# Patient Record
Sex: FEMALE | Race: WHITE | NOT HISPANIC OR LATINO | Employment: OTHER | URBAN - METROPOLITAN AREA
[De-identification: names, ages, dates, MRNs, and addresses within clinical notes are randomized per-mention and may not be internally consistent; named-entity substitution may affect disease eponyms.]

---

## 2023-03-23 ENCOUNTER — TELEPHONE (OUTPATIENT)
Dept: PRIMARY CARE | Facility: CLINIC | Age: 68
End: 2023-03-23
Payer: MEDICARE

## 2023-03-23 DIAGNOSIS — E03.9 ACQUIRED HYPOTHYROIDISM: Primary | ICD-10-CM

## 2023-03-23 RX ORDER — LEVOTHYROXINE SODIUM 25 UG/1
25 TABLET ORAL DAILY
Qty: 90 TABLET | Refills: 3 | Status: SHIPPED | OUTPATIENT
Start: 2023-03-23 | End: 2023-05-18 | Stop reason: SDUPTHER

## 2023-03-23 RX ORDER — LEVOTHYROXINE SODIUM 25 UG/1
25 TABLET ORAL DAILY
COMMUNITY
End: 2023-03-23 | Stop reason: SDUPTHER

## 2023-03-23 NOTE — TELEPHONE ENCOUNTER
**left on voicemail     Refill request for Levothyroxine 25mcg 1 tablet daily for 90 days     [facility]

## 2023-03-29 LAB — URINE CULTURE: ABNORMAL

## 2023-05-05 LAB
ALANINE AMINOTRANSFERASE (SGPT) (U/L) IN SER/PLAS: 23 U/L (ref 7–45)
ALBUMIN (G/DL) IN SER/PLAS: 4.2 G/DL (ref 3.4–5)
ALKALINE PHOSPHATASE (U/L) IN SER/PLAS: 74 U/L (ref 33–136)
ANION GAP IN SER/PLAS: 11 MMOL/L (ref 10–20)
ASPARTATE AMINOTRANSFERASE (SGOT) (U/L) IN SER/PLAS: 23 U/L (ref 9–39)
BASOPHILS (10*3/UL) IN BLOOD BY AUTOMATED COUNT: 0.03 X10E9/L (ref 0–0.1)
BASOPHILS/100 LEUKOCYTES IN BLOOD BY AUTOMATED COUNT: 0.5 % (ref 0–2)
BILIRUBIN TOTAL (MG/DL) IN SER/PLAS: 0.4 MG/DL (ref 0–1.2)
CALCIDIOL (25 OH VITAMIN D3) (NG/ML) IN SER/PLAS: 69 NG/ML
CALCIUM (MG/DL) IN SER/PLAS: 9.2 MG/DL (ref 8.6–10.3)
CARBON DIOXIDE, TOTAL (MMOL/L) IN SER/PLAS: 27 MMOL/L (ref 21–32)
CHLORIDE (MMOL/L) IN SER/PLAS: 105 MMOL/L (ref 98–107)
CHOLESTEROL (MG/DL) IN SER/PLAS: 258 MG/DL (ref 0–199)
CHOLESTEROL IN HDL (MG/DL) IN SER/PLAS: 100.6 MG/DL
CHOLESTEROL/HDL RATIO: 2.6
COBALAMIN (VITAMIN B12) (PG/ML) IN SER/PLAS: 1103 PG/ML (ref 211–911)
CREATININE (MG/DL) IN SER/PLAS: 0.73 MG/DL (ref 0.5–1.05)
EOSINOPHILS (10*3/UL) IN BLOOD BY AUTOMATED COUNT: 0.12 X10E9/L (ref 0–0.7)
EOSINOPHILS/100 LEUKOCYTES IN BLOOD BY AUTOMATED COUNT: 2.2 % (ref 0–6)
ERYTHROCYTE DISTRIBUTION WIDTH (RATIO) BY AUTOMATED COUNT: 13 % (ref 11.5–14.5)
ERYTHROCYTE MEAN CORPUSCULAR HEMOGLOBIN CONCENTRATION (G/DL) BY AUTOMATED: 33.2 G/DL (ref 32–36)
ERYTHROCYTE MEAN CORPUSCULAR VOLUME (FL) BY AUTOMATED COUNT: 90 FL (ref 80–100)
ERYTHROCYTES (10*6/UL) IN BLOOD BY AUTOMATED COUNT: 4.55 X10E12/L (ref 4–5.2)
ESTIMATED AVERAGE GLUCOSE FOR HBA1C: 134 MG/DL
GFR FEMALE: 90 ML/MIN/1.73M2
GLUCOSE (MG/DL) IN SER/PLAS: 103 MG/DL (ref 74–99)
HEMATOCRIT (%) IN BLOOD BY AUTOMATED COUNT: 41 % (ref 36–46)
HEMOGLOBIN (G/DL) IN BLOOD: 13.6 G/DL (ref 12–16)
HEMOGLOBIN A1C/HEMOGLOBIN TOTAL IN BLOOD: 6.3 %
IMMATURE GRANULOCYTES/100 LEUKOCYTES IN BLOOD BY AUTOMATED COUNT: 0.2 % (ref 0–0.9)
LDL: 144 MG/DL (ref 0–99)
LEUKOCYTES (10*3/UL) IN BLOOD BY AUTOMATED COUNT: 5.5 X10E9/L (ref 4.4–11.3)
LYMPHOCYTES (10*3/UL) IN BLOOD BY AUTOMATED COUNT: 2.4 X10E9/L (ref 1.2–4.8)
LYMPHOCYTES/100 LEUKOCYTES IN BLOOD BY AUTOMATED COUNT: 43.4 % (ref 13–44)
MONOCYTES (10*3/UL) IN BLOOD BY AUTOMATED COUNT: 0.51 X10E9/L (ref 0.1–1)
MONOCYTES/100 LEUKOCYTES IN BLOOD BY AUTOMATED COUNT: 9.2 % (ref 2–10)
NEUTROPHILS (10*3/UL) IN BLOOD BY AUTOMATED COUNT: 2.46 X10E9/L (ref 1.2–7.7)
NEUTROPHILS/100 LEUKOCYTES IN BLOOD BY AUTOMATED COUNT: 44.5 % (ref 40–80)
PLATELETS (10*3/UL) IN BLOOD AUTOMATED COUNT: 268 X10E9/L (ref 150–450)
POTASSIUM (MMOL/L) IN SER/PLAS: 4 MMOL/L (ref 3.5–5.3)
PROTEIN TOTAL: 7.4 G/DL (ref 6.4–8.2)
SODIUM (MMOL/L) IN SER/PLAS: 139 MMOL/L (ref 136–145)
THYROTROPIN (MIU/L) IN SER/PLAS BY DETECTION LIMIT <= 0.05 MIU/L: 0.42 MIU/L (ref 0.44–3.98)
THYROXINE (T4) FREE (NG/DL) IN SER/PLAS: 0.99 NG/DL (ref 0.61–1.12)
TRIGLYCERIDE (MG/DL) IN SER/PLAS: 66 MG/DL (ref 0–149)
UREA NITROGEN (MG/DL) IN SER/PLAS: 25 MG/DL (ref 6–23)
VLDL: 13 MG/DL (ref 0–40)

## 2023-05-16 PROBLEM — N60.21 SCLEROSING ADENOSIS OF RIGHT BREAST: Status: ACTIVE | Noted: 2023-05-16

## 2023-05-16 PROBLEM — R92.8 ABNORMAL MAMMOGRAM: Status: ACTIVE | Noted: 2023-05-16

## 2023-05-16 PROBLEM — M19.041 OSTEOARTHRITIS OF HANDS, BILATERAL: Status: ACTIVE | Noted: 2023-05-16
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
## 2023-05-16 PROBLEM — H66.91 RIGHT ACUTE OTITIS MEDIA: Status: ACTIVE | Noted: 2023-05-16

## 2023-05-16 PROBLEM — R92.0 MICROCALCIFICATION OF BREAST: Status: ACTIVE | Noted: 2023-05-16

## 2023-05-16 PROBLEM — M85.80 OSTEOPENIA: Status: ACTIVE | Noted: 2023-05-16

## 2023-05-16 PROBLEM — E03.9 HYPOTHYROIDISM IN ADULT: Status: ACTIVE | Noted: 2023-05-16

## 2023-05-16 PROBLEM — M25.519 SHOULDER PAIN: Status: ACTIVE | Noted: 2023-05-16

## 2023-05-16 PROBLEM — R73.03 PREDIABETES: Status: ACTIVE | Noted: 2023-05-16

## 2023-05-16 PROBLEM — N95.9 POSTMENOPAUSAL SYMPTOMS: Status: ACTIVE | Noted: 2023-05-16

## 2023-05-16 PROBLEM — S46.009A ROTATOR CUFF INJURY: Status: ACTIVE | Noted: 2023-05-16

## 2023-05-16 PROBLEM — E53.8 VITAMIN B12 DEFICIENCY: Status: ACTIVE | Noted: 2023-05-16

## 2023-05-16 PROBLEM — M19.042 OSTEOARTHRITIS OF HANDS, BILATERAL: Status: ACTIVE | Noted: 2023-05-16

## 2023-05-16 RX ORDER — VITAMIN E (DL,TOCOPHERYL ACET) 90 MG
1 CAPSULE ORAL DAILY
COMMUNITY
Start: 2021-01-12

## 2023-05-16 RX ORDER — EAR PLUGS
1 EACH OTIC (EAR) DAILY
COMMUNITY

## 2023-05-16 RX ORDER — CALCIUM CARBONATE 600 MG
600 TABLET ORAL DAILY
COMMUNITY
Start: 2021-01-12 | End: 2023-11-29 | Stop reason: WASHOUT

## 2023-05-16 RX ORDER — ZINC GLUCONATE 100 MG
100 TABLET ORAL DAILY
COMMUNITY
Start: 2021-01-12 | End: 2023-11-29 | Stop reason: WASHOUT

## 2023-05-16 RX ORDER — MULTIVITAMIN
1 TABLET ORAL DAILY
COMMUNITY

## 2023-05-16 RX ORDER — ACETAMINOPHEN 500 MG
50 TABLET ORAL DAILY
COMMUNITY

## 2023-05-18 ENCOUNTER — OFFICE VISIT (OUTPATIENT)
Dept: PRIMARY CARE | Facility: CLINIC | Age: 68
End: 2023-05-18
Payer: MEDICARE

## 2023-05-18 VITALS
HEIGHT: 63 IN | HEART RATE: 71 BPM | OXYGEN SATURATION: 97 % | RESPIRATION RATE: 16 BRPM | SYSTOLIC BLOOD PRESSURE: 124 MMHG | DIASTOLIC BLOOD PRESSURE: 74 MMHG | WEIGHT: 125 LBS | BODY MASS INDEX: 22.15 KG/M2

## 2023-05-18 DIAGNOSIS — E78.2 MIXED HYPERLIPIDEMIA: Primary | ICD-10-CM

## 2023-05-18 DIAGNOSIS — R73.03 PREDIABETES: ICD-10-CM

## 2023-05-18 DIAGNOSIS — E03.9 ACQUIRED HYPOTHYROIDISM: ICD-10-CM

## 2023-05-18 PROCEDURE — 1036F TOBACCO NON-USER: CPT

## 2023-05-18 PROCEDURE — 99214 OFFICE O/P EST MOD 30 MIN: CPT

## 2023-05-18 PROCEDURE — 1160F RVW MEDS BY RX/DR IN RCRD: CPT

## 2023-05-18 PROCEDURE — 1159F MED LIST DOCD IN RCRD: CPT

## 2023-05-18 RX ORDER — LEVOTHYROXINE SODIUM 25 UG/1
25 TABLET ORAL DAILY
Qty: 90 TABLET | Refills: 1 | Status: SHIPPED | OUTPATIENT
Start: 2023-05-18 | End: 2024-05-29 | Stop reason: SDUPTHER

## 2023-05-18 RX ORDER — LEVOTHYROXINE SODIUM 25 UG/1
25 TABLET ORAL DAILY
Qty: 90 TABLET | Refills: 3 | Status: CANCELLED | OUTPATIENT
Start: 2023-05-18 | End: 2024-05-17

## 2023-05-18 RX ORDER — ATORVASTATIN CALCIUM 20 MG/1
20 TABLET, FILM COATED ORAL DAILY
Qty: 90 TABLET | Refills: 1 | Status: SHIPPED | OUTPATIENT
Start: 2023-05-18 | End: 2023-11-29 | Stop reason: WASHOUT

## 2023-05-18 ASSESSMENT — ANXIETY QUESTIONNAIRES
5. BEING SO RESTLESS THAT IT IS HARD TO SIT STILL: NOT AT ALL
1. FEELING NERVOUS, ANXIOUS, OR ON EDGE: NOT AT ALL
6. BECOMING EASILY ANNOYED OR IRRITABLE: NOT AT ALL
2. NOT BEING ABLE TO STOP OR CONTROL WORRYING: NOT AT ALL
GAD7 TOTAL SCORE: 0
3. WORRYING TOO MUCH ABOUT DIFFERENT THINGS: NOT AT ALL
IF YOU CHECKED OFF ANY PROBLEMS ON THIS QUESTIONNAIRE, HOW DIFFICULT HAVE THESE PROBLEMS MADE IT FOR YOU TO DO YOUR WORK, TAKE CARE OF THINGS AT HOME, OR GET ALONG WITH OTHER PEOPLE: NOT DIFFICULT AT ALL
7. FEELING AFRAID AS IF SOMETHING AWFUL MIGHT HAPPEN: NOT AT ALL
4. TROUBLE RELAXING: NOT AT ALL

## 2023-05-18 ASSESSMENT — ENCOUNTER SYMPTOMS
CARDIOVASCULAR NEGATIVE: 1
MUSCULOSKELETAL NEGATIVE: 1
DEPRESSION: 0
ALLERGIC/IMMUNOLOGIC NEGATIVE: 1
NEUROLOGICAL NEGATIVE: 1
EYES NEGATIVE: 1
LOSS OF SENSATION IN FEET: 0
RESPIRATORY NEGATIVE: 1
GASTROINTESTINAL NEGATIVE: 1
OCCASIONAL FEELINGS OF UNSTEADINESS: 0
CONSTITUTIONAL NEGATIVE: 1
PSYCHIATRIC NEGATIVE: 1
ENDOCRINE NEGATIVE: 1
HEMATOLOGIC/LYMPHATIC NEGATIVE: 1

## 2023-05-18 ASSESSMENT — PATIENT HEALTH QUESTIONNAIRE - PHQ9
2. FEELING DOWN, DEPRESSED OR HOPELESS: NOT AT ALL
1. LITTLE INTEREST OR PLEASURE IN DOING THINGS: NOT AT ALL
1. LITTLE INTEREST OR PLEASURE IN DOING THINGS: NOT AT ALL
SUM OF ALL RESPONSES TO PHQ9 QUESTIONS 1 AND 2: 0
2. FEELING DOWN, DEPRESSED OR HOPELESS: NOT AT ALL
SUM OF ALL RESPONSES TO PHQ9 QUESTIONS 1 AND 2: 0

## 2023-05-18 NOTE — PATIENT INSTRUCTIONS
6 month medicare wellness with labs.    Levothyroxine 12.5mcg daily.     Repeat blood work before next appointment.

## 2023-05-18 NOTE — PROGRESS NOTES
"Subjective   Patient ID: Brie Neumann is a 67 y.o. female who presents for Follow-up.    HPI a 67-year-old female with past medical history of hypothyroidism, hyperlipidemia, vitamin deficiency arrives to the clinic with chief complaint of 6-month follow-up.  At her last appointment, she completed her Medicare wellness exam.  She was also given instructions to complete blood work.  She is here to report that she completed her blood work and would like to review them today.  Other than this, the patient denies any chest pain, shortness of breath, diarrhea, fevers, chills, head pain, COVID-like symptoms.    Review of Systems   Constitutional: Negative.    HENT: Negative.     Eyes: Negative.    Respiratory: Negative.     Cardiovascular: Negative.    Gastrointestinal: Negative.    Endocrine: Negative.    Genitourinary: Negative.    Musculoskeletal: Negative.    Skin: Negative.    Allergic/Immunologic: Negative.    Neurological: Negative.    Hematological: Negative.    Psychiatric/Behavioral: Negative.     All other systems reviewed and are negative.      Objective   /74   Pulse 71   Resp 16   Ht 1.6 m (5' 3\")   Wt 56.7 kg (125 lb)   SpO2 97%   BMI 22.14 kg/m²     Physical Exam  Vitals and nursing note reviewed.   Constitutional:       Appearance: Normal appearance.   HENT:      Head: Normocephalic and atraumatic.      Right Ear: Tympanic membrane normal.      Left Ear: Tympanic membrane normal.      Nose: Nose normal.      Mouth/Throat:      Mouth: Mucous membranes are moist.      Pharynx: Oropharynx is clear.   Eyes:      Extraocular Movements: Extraocular movements intact.      Conjunctiva/sclera: Conjunctivae normal.      Pupils: Pupils are equal, round, and reactive to light.   Cardiovascular:      Rate and Rhythm: Normal rate and regular rhythm.   Pulmonary:      Effort: Pulmonary effort is normal.      Breath sounds: Normal breath sounds.   Abdominal:      General: Bowel sounds are normal.      " Palpations: Abdomen is soft.   Musculoskeletal:         General: Normal range of motion.      Cervical back: Normal range of motion and neck supple.   Skin:     General: Skin is warm.      Capillary Refill: Capillary refill takes less than 2 seconds.   Neurological:      General: No focal deficit present.      Mental Status: She is alert and oriented to person, place, and time. Mental status is at baseline.   Psychiatric:         Mood and Affect: Mood normal.         Behavior: Behavior normal.         Thought Content: Thought content normal.         Judgment: Judgment normal.         Assessment/Plan   Problem List Items Addressed This Visit          Endocrine/Metabolic    Prediabetes    Relevant Orders    Hemoglobin A1C    Follow Up In [facility] - PCP     Other Visit Diagnoses       Mixed hyperlipidemia    -  Primary    Relevant Medications    atorvastatin (Lipitor) 20 mg tablet    Other Relevant Orders    Lipid Panel    Follow Up [facility] - PCP    Acquired hypothyroidism        Relevant Medications    levothyroxine (Synthroid, Levoxyl) 25 mcg tablet    Other Relevant Orders    Follow Up [facility] - PCP    TSH with reflex to Free T4 if abnormal    Follow Up In [facility] - PCP          It was a pleasure seeing you in the office today.    Lipid panel shows elevated total cholesterol and LDL.  Although your HDL is elevated, I am still concerned about your total cholesterol and LDL.  Please begin taking atorvastatin 20 mg oral tablet daily.  We will reevaluate lipid panel in 6 months.    TSH level shows overactive thyroid.  You are currently taking levothyroxine 25 mcg daily.  Please begin taking levothyroxine 12.5 mcg daily.  Reevaluate TSH levels in 6 months.    Continue all over-the-counter vitamins for supplementation.    I have ordered blood work for you to complete prior to your next appointment.  These labs require you to fast.  Your next appointment will be  a 6-month Medicare wellness exam.  We will discuss screening exams of a Cologuard screening kit, bone density, mammogram, CT cardiac scoring, low-dose CT of the lungs at that time.    I also advised you to follow low fat diet and exercise for at least 30 minutes daily.    Anticipatory guidance, age appropriate vaccines, screening exams, health promotion and prevention discussed.    This document was generated using the assistance of voice recognition software. If there are any errors of spelling, grammar, syntax, or meaning; please feel free to contact me directly for clarification.

## 2023-08-21 ENCOUNTER — TELEPHONE (OUTPATIENT)
Dept: PRIMARY CARE | Facility: CLINIC | Age: 68
End: 2023-08-21
Payer: MEDICARE

## 2023-08-21 NOTE — TELEPHONE ENCOUNTER
Patients wants your opinion on the RSV vaccine. Do you recommend it for her and her . She also stated her  has MS. Thanks!

## 2023-11-21 ENCOUNTER — APPOINTMENT (OUTPATIENT)
Dept: PRIMARY CARE | Facility: CLINIC | Age: 68
End: 2023-11-21
Payer: MEDICARE

## 2023-11-22 ENCOUNTER — LAB (OUTPATIENT)
Dept: LAB | Facility: LAB | Age: 68
End: 2023-11-22
Payer: MEDICARE

## 2023-11-22 DIAGNOSIS — R73.03 PREDIABETES: ICD-10-CM

## 2023-11-22 DIAGNOSIS — E03.9 ACQUIRED HYPOTHYROIDISM: ICD-10-CM

## 2023-11-22 DIAGNOSIS — E78.2 MIXED HYPERLIPIDEMIA: ICD-10-CM

## 2023-11-22 LAB
CHOLEST SERPL-MCNC: 242 MG/DL (ref 0–199)
CHOLESTEROL/HDL RATIO: 2.6
EST. AVERAGE GLUCOSE BLD GHB EST-MCNC: 131 MG/DL
HBA1C MFR BLD: 6.2 %
HDLC SERPL-MCNC: 91.5 MG/DL
LDLC SERPL CALC-MCNC: 134 MG/DL
NON HDL CHOLESTEROL: 151 MG/DL (ref 0–149)
TRIGL SERPL-MCNC: 85 MG/DL (ref 0–149)
TSH SERPL-ACNC: 0.87 MIU/L (ref 0.44–3.98)
VLDL: 17 MG/DL (ref 0–40)

## 2023-11-22 PROCEDURE — 84443 ASSAY THYROID STIM HORMONE: CPT

## 2023-11-22 PROCEDURE — 83036 HEMOGLOBIN GLYCOSYLATED A1C: CPT

## 2023-11-22 PROCEDURE — 36415 COLL VENOUS BLD VENIPUNCTURE: CPT

## 2023-11-22 PROCEDURE — 80061 LIPID PANEL: CPT

## 2023-11-29 ENCOUNTER — OFFICE VISIT (OUTPATIENT)
Dept: PRIMARY CARE | Facility: CLINIC | Age: 68
End: 2023-11-29
Payer: MEDICARE

## 2023-11-29 VITALS
HEART RATE: 65 BPM | RESPIRATION RATE: 16 BRPM | SYSTOLIC BLOOD PRESSURE: 128 MMHG | WEIGHT: 124 LBS | DIASTOLIC BLOOD PRESSURE: 68 MMHG | BODY MASS INDEX: 21.97 KG/M2 | HEIGHT: 63 IN | OXYGEN SATURATION: 97 %

## 2023-11-29 DIAGNOSIS — R73.03 PREDIABETES: ICD-10-CM

## 2023-11-29 DIAGNOSIS — Z00.00 MEDICARE ANNUAL WELLNESS VISIT, SUBSEQUENT: Primary | ICD-10-CM

## 2023-11-29 DIAGNOSIS — E03.9 ACQUIRED HYPOTHYROIDISM: Chronic | ICD-10-CM

## 2023-11-29 DIAGNOSIS — Z23 NEED FOR STREPTOCOCCUS PNEUMONIAE VACCINATION: ICD-10-CM

## 2023-11-29 DIAGNOSIS — M85.89 OSTEOPENIA OF MULTIPLE SITES: Chronic | ICD-10-CM

## 2023-11-29 DIAGNOSIS — E78.2 MIXED HYPERLIPIDEMIA: Chronic | ICD-10-CM

## 2023-11-29 PROBLEM — H66.91 RIGHT ACUTE OTITIS MEDIA: Status: RESOLVED | Noted: 2023-05-16 | Resolved: 2023-11-29

## 2023-11-29 PROBLEM — M85.80 OSTEOPENIA: Chronic | Status: ACTIVE | Noted: 2023-05-16

## 2023-11-29 PROCEDURE — 1170F FXNL STATUS ASSESSED: CPT | Performed by: STUDENT IN AN ORGANIZED HEALTH CARE EDUCATION/TRAINING PROGRAM

## 2023-11-29 PROCEDURE — 1160F RVW MEDS BY RX/DR IN RCRD: CPT | Performed by: STUDENT IN AN ORGANIZED HEALTH CARE EDUCATION/TRAINING PROGRAM

## 2023-11-29 PROCEDURE — 99214 OFFICE O/P EST MOD 30 MIN: CPT | Performed by: STUDENT IN AN ORGANIZED HEALTH CARE EDUCATION/TRAINING PROGRAM

## 2023-11-29 PROCEDURE — G0439 PPPS, SUBSEQ VISIT: HCPCS | Performed by: STUDENT IN AN ORGANIZED HEALTH CARE EDUCATION/TRAINING PROGRAM

## 2023-11-29 PROCEDURE — 1159F MED LIST DOCD IN RCRD: CPT | Performed by: STUDENT IN AN ORGANIZED HEALTH CARE EDUCATION/TRAINING PROGRAM

## 2023-11-29 PROCEDURE — G0009 ADMIN PNEUMOCOCCAL VACCINE: HCPCS | Performed by: STUDENT IN AN ORGANIZED HEALTH CARE EDUCATION/TRAINING PROGRAM

## 2023-11-29 PROCEDURE — 1036F TOBACCO NON-USER: CPT | Performed by: STUDENT IN AN ORGANIZED HEALTH CARE EDUCATION/TRAINING PROGRAM

## 2023-11-29 PROCEDURE — 90677 PCV20 VACCINE IM: CPT | Performed by: STUDENT IN AN ORGANIZED HEALTH CARE EDUCATION/TRAINING PROGRAM

## 2023-11-29 ASSESSMENT — ACTIVITIES OF DAILY LIVING (ADL)
DOING_HOUSEWORK: INDEPENDENT
DRESSING: INDEPENDENT
BATHING: INDEPENDENT
MANAGING_FINANCES: INDEPENDENT
TAKING_MEDICATION: INDEPENDENT
GROCERY_SHOPPING: INDEPENDENT

## 2023-11-29 ASSESSMENT — ENCOUNTER SYMPTOMS
FATIGUE: 0
APPETITE CHANGE: 0
ARTHRALGIAS: 0
ABDOMINAL PAIN: 0
TROUBLE SWALLOWING: 0
FEVER: 0
DYSPHORIC MOOD: 0
SPEECH DIFFICULTY: 0
PALPITATIONS: 0
OCCASIONAL FEELINGS OF UNSTEADINESS: 0
VOICE CHANGE: 0
LOSS OF SENSATION IN FEET: 0
DEPRESSION: 0
ACTIVITY CHANGE: 0
CONFUSION: 0
FACIAL ASYMMETRY: 0
UNEXPECTED WEIGHT CHANGE: 0

## 2023-11-29 ASSESSMENT — PATIENT HEALTH QUESTIONNAIRE - PHQ9
SUM OF ALL RESPONSES TO PHQ9 QUESTIONS 1 AND 2: 0
2. FEELING DOWN, DEPRESSED OR HOPELESS: NOT AT ALL
1. LITTLE INTEREST OR PLEASURE IN DOING THINGS: NOT AT ALL

## 2023-11-29 NOTE — ASSESSMENT & PLAN NOTE
ASCVD risk score of 6.7     Counseling on risk modifications with diet and exercise   She would like to try red yeast rice over lipitor, counseling on risk vs benefit

## 2023-11-29 NOTE — PROGRESS NOTES
"Subjective   Reason for Visit: Brie Neumann is an 68 y.o. female here for a Medicare Wellness visit.     Past Medical, Surgical, and Family History reviewed and updated in chart.    Reviewed all medications by prescribing practitioner or clinical pharmacist (such as prescriptions, OTCs, herbal therapies and supplements) and documented in the medical record.    HPI    67 yo female here to transfer care, former [facility] patient, medicare and lab review     Cholesterol  0 - 199 mg/dL 242 High  258 High  CM     LDL Calculated  <=99 mg/dL 134 High  144 High  R, CM     Hemoglobin A1C  see below % 6.2 High  6.3 Abnormal  R, CM 6.2 Abnormal  R, CM 6.2 R, CM 6.3     Patient Care Team:  Kandis Lala DO as PCP - General (Family Medicine)  BONILLA Vital as PCP - [facility] Attributed Provider     Review of Systems   Constitutional:  Negative for activity change, appetite change, fatigue, fever and unexpected weight change.   HENT:  Negative for trouble swallowing and voice change.    Eyes:  Negative for visual disturbance.   Cardiovascular:  Negative for chest pain, palpitations and leg swelling.   Gastrointestinal:  Negative for abdominal pain.   Musculoskeletal:  Negative for arthralgias and gait problem.   Skin:  Negative for rash.   Allergic/Immunologic: Negative for immunocompromised state.   Neurological:  Negative for facial asymmetry and speech difficulty.   Psychiatric/Behavioral:  Negative for confusion and dysphoric mood.      Objective   Vitals:  /68   Pulse 65   Resp 16   Ht 1.6 m (5' 3\")   Wt 56.2 kg (124 lb)   SpO2 97%   BMI 21.97 kg/m²       Physical Exam  Constitutional:       Appearance: Normal appearance.   HENT:      Head: Normocephalic and atraumatic.   Eyes:      Extraocular Movements: Extraocular movements intact.   Cardiovascular:      Rate and Rhythm: Normal rate and regular rhythm.   Pulmonary:      Effort: Pulmonary effort is normal. No respiratory distress.   Musculoskeletal: "      Right lower leg: No edema.      Left lower leg: No edema.   Skin:     Coloration: Skin is not jaundiced or pale.   Neurological:      General: No focal deficit present.      Mental Status: She is alert and oriented to person, place, and time.   Psychiatric:         Mood and Affect: Mood normal.         Behavior: Behavior normal.         Thought Content: Thought content normal.         Judgment: Judgment normal.         Assessment/Plan   Problem List Items Addressed This Visit       Acquired hypothyroidism (Chronic)    Current Assessment & Plan     Labs updated 11/2023, continue 25mcg dosing of levothyroxine          Relevant Orders    Tsh With Reflex To Free T4 If Abnormal    Osteopenia (Chronic)    Current Assessment & Plan     2022 DEXA- Femoral neck -1.5, lumbar spine -2.4  1.  1200 mg - 1500 mg calcium per day if no history of renal calculi for   adults 50 years and over.  2.  800 - 1000 International Units of vitamin D3 per day if no history of   renal calculi for adults 50 years and over.  3.  Weight bearing exercise  4.  Advise again smoking.  If currently smoking, recommend cessation.  5.  Avoid excessive use of caffeine, soft drinks, and alcoholic beverages.           Prediabetes    Current Assessment & Plan     6.2-6.3 chronically  Recommend working on diet and exercise to improve these numbers          Relevant Orders    Hemoglobin A1C    Comprehensive Metabolic Panel    Follow Up In [facility] - PCP - Established    Mixed hyperlipidemia (Chronic)    Current Assessment & Plan     ASCVD risk score of 6.7     Counseling on risk modifications with diet and exercise   She would like to try red yeast rice over lipitor, counseling on risk vs benefit          Relevant Orders    Lipid Panel    Medicare annual wellness visit, subsequent - Primary    Current Assessment & Plan     PNEUMONIA vaccine- Ordered  SHINGLES vaccine- Completed   INFLUENZA vaccine-Completed   Screening tests:  Colon  cancer screening--> Due 2032, 10 year schedule  Breast Cancer screening--> Due Feb 2024  Cervical Cancer Screening-->Not Indicated  DXA screening--> Completed 2022, not due at this time   During the course of the visit the patient was educated and counseled about age appropriate screening and preventive services. Completed preventive screenings were documented in the chart and orders were placed for outstanding screenings/procedures as documented in the Assessment and Plan.  Patient Instructions (the written plan) was given to the patient at check out.            Other Visit Diagnoses       Need for Streptococcus pneumoniae vaccination        Relevant Orders    Pneumococcal conjugate vaccine, 20-valent (PREVNAR 20)

## 2023-12-02 NOTE — ASSESSMENT & PLAN NOTE
2022 DEXA- Femoral neck -1.5, lumbar spine -2.4  1.  1200 mg - 1500 mg calcium per day if no history of renal calculi for   adults 50 years and over.  2.  800 - 1000 International Units of vitamin D3 per day if no history of   renal calculi for adults 50 years and over.  3.  Weight bearing exercise  4.  Advise again smoking.  If currently smoking, recommend cessation.  5.  Avoid excessive use of caffeine, soft drinks, and alcoholic beverages.     no history of blood product transfusion

## 2024-02-07 ENCOUNTER — TELEPHONE (OUTPATIENT)
Dept: SURGERY | Facility: CLINIC | Age: 69
End: 2024-02-07
Payer: MEDICARE

## 2024-02-07 ENCOUNTER — HOSPITAL ENCOUNTER (OUTPATIENT)
Dept: RADIOLOGY | Facility: HOSPITAL | Age: 69
Discharge: HOME | End: 2024-02-07
Payer: MEDICARE

## 2024-02-07 ENCOUNTER — APPOINTMENT (OUTPATIENT)
Dept: RADIOLOGY | Facility: HOSPITAL | Age: 69
End: 2024-02-07
Payer: MEDICARE

## 2024-02-07 DIAGNOSIS — R92.8 ABNORMAL MAMMOGRAM: Primary | ICD-10-CM

## 2024-02-07 DIAGNOSIS — R92.1 CALCIFICATION OF LEFT BREAST: Primary | ICD-10-CM

## 2024-02-07 DIAGNOSIS — Z00.00 ENCOUNTER FOR GENERAL ADULT MEDICAL EXAMINATION WITHOUT ABNORMAL FINDINGS: ICD-10-CM

## 2024-02-07 PROCEDURE — 77063 BREAST TOMOSYNTHESIS BI: CPT | Mod: BILATERAL PROCEDURE | Performed by: RADIOLOGY

## 2024-02-07 PROCEDURE — 77067 SCR MAMMO BI INCL CAD: CPT | Mod: BILATERAL PROCEDURE | Performed by: RADIOLOGY

## 2024-02-07 PROCEDURE — 77067 SCR MAMMO BI INCL CAD: CPT

## 2024-02-07 NOTE — TELEPHONE ENCOUNTER
----- Message from Mally French MD sent at 2/7/2024  3:15 PM EST -----  Left a message on the patient's cell phone, and talked with her when she called back.  Her recent mammogram shows calcifications of the left breast for which a diagnostic mammogram and possible ultrasound is recommended.  1.  Schedule for diagnostic left mammogram  2.  May postpone her office appointment (currently scheduled for 2/13/2024) until after the diagnostic mammogram is completed

## 2024-02-07 NOTE — RESULT ENCOUNTER NOTE
Left a message on the patient's cell phone, and talked with her when she called back.  Her recent mammogram shows calcifications of the left breast for which a diagnostic mammogram and possible ultrasound is recommended.  1.  Schedule for diagnostic left mammogram  2.  May postpone her office appointment (currently scheduled for 2/13/2024) until after the diagnostic mammogram is completed

## 2024-02-08 ENCOUNTER — TELEPHONE (OUTPATIENT)
Dept: PRIMARY CARE | Facility: CLINIC | Age: 69
End: 2024-02-08
Payer: MEDICARE

## 2024-02-08 NOTE — TELEPHONE ENCOUNTER
----- Message from Kandis Lala DO sent at 2/7/2024  2:12 PM EST -----  Review of mammogram- there is a new cluster of calcifications along the L breast that radiology would like to get a better look at. I ordered ultrasound imaging and a diagnostic view to help us achieve that.

## 2024-02-13 ENCOUNTER — ANCILLARY ORDERS (OUTPATIENT)
Dept: SURGERY | Facility: HOSPITAL | Age: 69
End: 2024-02-13

## 2024-02-13 ENCOUNTER — APPOINTMENT (OUTPATIENT)
Dept: SURGERY | Facility: CLINIC | Age: 69
End: 2024-02-13
Payer: MEDICARE

## 2024-02-13 ENCOUNTER — HOSPITAL ENCOUNTER (OUTPATIENT)
Dept: RADIOLOGY | Facility: HOSPITAL | Age: 69
Discharge: HOME | End: 2024-02-13
Payer: MEDICARE

## 2024-02-13 DIAGNOSIS — R92.1 CALCIFICATION OF LEFT BREAST: Primary | ICD-10-CM

## 2024-02-13 DIAGNOSIS — Z12.31 ENCOUNTER FOR SCREENING MAMMOGRAM FOR MALIGNANT NEOPLASM OF BREAST: ICD-10-CM

## 2024-02-13 DIAGNOSIS — R92.1 CALCIFICATION OF LEFT BREAST: ICD-10-CM

## 2024-02-13 PROCEDURE — G0279 TOMOSYNTHESIS, MAMMO: HCPCS | Mod: LEFT SIDE | Performed by: RADIOLOGY

## 2024-02-13 PROCEDURE — 77065 DX MAMMO INCL CAD UNI: CPT | Mod: LEFT SIDE | Performed by: RADIOLOGY

## 2024-02-13 PROCEDURE — 77065 DX MAMMO INCL CAD UNI: CPT | Mod: LT

## 2024-02-14 ENCOUNTER — TELEPHONE (OUTPATIENT)
Dept: PRIMARY CARE | Facility: CLINIC | Age: 69
End: 2024-02-14
Payer: MEDICARE

## 2024-02-14 NOTE — TELEPHONE ENCOUNTER
----- Message from Kandis Lala, DO sent at 2/14/2024  7:21 AM EST -----  Patient has dense and scared areas of the breast, the radiologist recommends we do a [id number] schedule to monitor. Order placed.

## 2024-02-21 ENCOUNTER — OFFICE VISIT (OUTPATIENT)
Dept: SURGERY | Facility: CLINIC | Age: 69
End: 2024-02-21
Payer: MEDICARE

## 2024-02-21 VITALS
WEIGHT: 125 LBS | SYSTOLIC BLOOD PRESSURE: 137 MMHG | BODY MASS INDEX: 22.15 KG/M2 | OXYGEN SATURATION: 95 % | HEART RATE: 67 BPM | HEIGHT: 63 IN | DIASTOLIC BLOOD PRESSURE: 85 MMHG

## 2024-02-21 DIAGNOSIS — R92.1 CALCIFICATION OF LEFT BREAST: Primary | ICD-10-CM

## 2024-02-21 DIAGNOSIS — R92.1 CALCIFICATION OF RIGHT BREAST: ICD-10-CM

## 2024-02-21 DIAGNOSIS — N60.21 SCLEROSING ADENOSIS OF RIGHT BREAST: ICD-10-CM

## 2024-02-21 DIAGNOSIS — R92.8 ABNORMAL MAMMOGRAM: ICD-10-CM

## 2024-02-21 PROCEDURE — 1159F MED LIST DOCD IN RCRD: CPT | Performed by: SURGERY

## 2024-02-21 PROCEDURE — 99213 OFFICE O/P EST LOW 20 MIN: CPT | Performed by: SURGERY

## 2024-02-21 PROCEDURE — 1160F RVW MEDS BY RX/DR IN RCRD: CPT | Performed by: SURGERY

## 2024-02-21 PROCEDURE — 1036F TOBACCO NON-USER: CPT | Performed by: SURGERY

## 2024-02-21 PROCEDURE — 1157F ADVNC CARE PLAN IN RCRD: CPT | Performed by: SURGERY

## 2024-02-21 NOTE — PROGRESS NOTES
"    GENERAL SURGERY OFFICE NOTE    Patient: Brie Neumann    Age: 68 y.o.   Gender: female    MRN: [id number]    PCP: Kandis Lala, DO        SUBJECTIVE     Chief Complaint  Follow-up (Patient is here for 6 month breast follow up.  Patient states no new findings. )       HPI  Brie returns to the office for a 6-month follow-up of her left breast asymmetry identified 6 months ago.  She had a follow-up screening mammogram of both breasts recently.  She has known calcifications of the right breast which previously had undergone a core needle biopsy with benign results.  These calcifications have been stable over 2 years.  The asymmetry of the left breast that was identified 6 months ago, is no longer visible on her mammogram.  However, new left-sided calcifications were identified.  Therefore, she underwent a diagnostic mammogram of her left breast which showed \"loose calcifications\" in the left breast.  Short-term follow-up was recommended.  Biopsy was not recommended.  She currently does not have any new breast complaints.  No pain, no palpable masses, no skin changes and no nipple discharge.  She is not having any other significant health issues or changes since her last office visit.  She is anticipating her second grandson within the next 1 to 2 months.     Risk factors for breast cancer: 65-year-old white female; menarche at age 12 or 13; first live birth at age 30; 2 previous breast biopsies; she had a sister who  at age 52 of metastatic cancer which may, or may not, have been breast cancer. She does state that her mother  of bone cancer at age 57 but also does not know if this was breast cancer, or not. This gives her a 5-year Carli score of 5% and a lifetime risk of 17.8% assuming one first-degree relative had breast cancer. If no first-degree relative had breast cancer, she still has a higher risk score with a 5-year risk score of 3.4% and a lifetime risk of 12.6%. Overall this puts her in a " higher than average risk breast cancer category. Right breast biopsy diagnosis with sclerosing adenosis is also a marker for increased risk of breast cancer.     ROS  Review of Systems   Constitutional: no fever~and~no chills.   Eyes: no loss of vision,~no discharge from the eyes,~no itching of the eyes~and~no eye pain.   ENT: no hearing loss,~no neck pain~and~no hoarseness.   Cardiovascular: no chest pain,~no palpitations~and~no lower extremity edema.   Respiratory: no dyspnea,~no dyspnea during exertion~and~no cough.   Breast: no nipple discharge,~no breast mass,~no pain in breast,~no erythema,~no change in breast skin~and~no axillary adenopathy.   Gastrointestinal: no abdominal pain,~no vomiting,~no nausea.   Genitourinary: no dysuria~and~no hematuria.   Musculoskeletal: no arthralgias~and~no myalgias.   Integumentary: no rashes~and~no skin lesions.   Neurological: no headache,~no dizziness~and~no numbness.   Psychiatric: no anxiety,~no depression~and~no emotional problems.   Endocrine: no heat or cold intolerance~and~no increased thirst.   Hematologic/Lymphatic: no tendency for easy bleeding~and~no tendency for easy bruising.   All other systems have been reviewed and are negative for complaint.     HISTORY     Past Medical History:   Diagnosis Date    Personal history of other endocrine, nutritional and metabolic disease     History of hypothyroidism        Past Surgical History:   Procedure Laterality Date    INCISIONAL BREAST BIOPSY Bilateral     benign bilat breast bx    OTHER SURGICAL HISTORY  12/13/2019    Breast biopsy        No Known Allergies     Social History     Tobacco Use   Smoking Status Never   Smokeless Tobacco Never        Social History     Substance and Sexual Activity   Alcohol Use Never        HOME MEDICATIONS  Current Outpatient Medications   Medication Instructions    Ca carb-D3-argnin-inos-silicon (Bone Density Calcium Plus D) 300-200-37.5 mg-unit-mg tablet 1 tablet, oral, Daily     "cholecalciferol (VITAMIN D-3) 50 mcg, oral, Daily    coenzyme Q10 400 mg capsule 1 capsule, oral, Daily    fish oil concentrate (Omega-3) 120-180 mg capsule 1 g, oral, Daily    levothyroxine (SYNTHROID, LEVOXYL) 25 mcg, oral, Daily    multivitamin tablet 1 tablet, oral, Daily          OBJECTIVE   Last Recorded Vitals.  Blood pressure 137/85, pulse 67, height 1.6 m (5' 3\"), weight 56.7 kg (125 lb), SpO2 95 %.     PHYSICAL EXAM  Physical Exam   General: Well-developed, well-nourished and in no acute distress.  Head: Normocephalic. Atraumatic.  Neck/thyroid: Neck is supple. Normal thyroid without mass. No jugular venous distention.  Eyes: Pupils equal round and reactive to light. Conjunctiva normal.  ENMT: No masses or deformity of external nose. External ears without masses.  Respiratory/Chest: Normal respiratory effort.  Breast: Symmetrical bilateral small breast. No palpable masses. Right breast has scar from biopsy at the 9 o'clock position. No palpable masses, but some dense breast tissue especially of the upper outer quadrant. Left breast has scar from biopsy at 12 o'clock position. No palpable mass, but there is some dense breast tissue especially of the upper outer quadrant. There is a 2 x 2 centimeter lipoma just outside the inferior lateral aspect of the right breast tissue.  Lymphatics: No palpable lymphadenopathy of the cervical, supraclavicular or axillary regions.  Cardiovascular: Regular rate and rhythm.   Abdomen: Soft, nontender, nondistended. No hernias. No hepatomegaly, splenomegaly or palpable masses.  Rectal: Deferred  : Normal external genitalia  Musculoskeletal: Joints and limbs are grossly normal. Normal gait. Normal range of motion of major joints.  Neuro: Oriented to person, place and time. No obvious neurological deficit. Motor strength grossly normal.  Psych: Normal mood and affect.     RESULTS   Labs  No results found for this or any previous visit (from the past 24 hour(s)).    Radiology " Holly  MAMMO BILATERAL SCREENING TOMOSYNTHESIS;  2/7/2024 9:19 am      ACCESSION NUMBER(S):  [id number]      ORDERING CLINICIAN:  DEEPALI DEL ANGEL      INDICATION:  Screening.      COMPARISON:  Multiple prior examinations dating back to 09/06/2016      FINDINGS:  2D and tomosynthesis images were reviewed at 1 mm slice thickness.      Density:  There are areas of scattered fibroglandular tissue.      Scarring both breasts. 9 mm asymmetry left breast posterior depth cc  projection stable.      There are scattered calcifications throughout both breasts.      A small cluster of calcifications identified of the left breast  posterior depth MLO projection just inferior to the posterior nipple  line indeterminate. Spot magnification compression views recommended.      Biopsy clip identified within the right breast.      IMPRESSION:  Indeterminate calcifications posterior depth left breast.      Right breast is stable.      BI-RADS CATEGORY:      BI-RADS Category:  0 Incomplete; Need Additional Imaging Evaluation  and/or Prior Mammograms for Comparison. Recommendation:  Additional  Imaging. Recommended Date:  Immediate.  Laterality:  Left.    MAMMO LEFT DIAGNOSTIC;  2/13/2024 2:57 pm      ACCESSION NUMBER(S):  [id number]      ORDERING CLINICIAN:  DEEPALI DEL ANGEL      INDICATION:  Signs/Symptoms:Left breast cluster of calcifications on screening  mammogram.      COMPARISON:  02/07/2024      FINDINGS:  2D and tomosynthesis images were reviewed at 1 mm slice thickness.      Density:  There are areas of scattered fibroglandular tissue.      Scarring left breast.      There are 2 sets of loosening cluster calcifications identified  appearing coarsened probably benign. No associated mass. Continued  surveillance is recommended.          IMPRESSION:  Probable benign calcifications left breast. Follow-up examination  recommended starting date 02/07/2024.      BI-RADS CATEGORY:      BI-RADS Category:  3 Probably  Benign.  Recommendation:  Short-term Interval Follow-up Imaging.  Recommended Date:  6 Months.  Laterality:  Left.      For any future breast imaging appointments, please call [phone]  ([phone]).          MACRO:  None      Signed by: Joo Cope 2/13/2024 3:19 PM  Dictation workstation:   [id number]      ASSESSMENT / PLAN   Diagnoses and all orders for this visit:  Calcification of left breast  -     BI mammo left diagnostic tomosynthesis; Future  Calcification of right breast  Sclerosing adenosis of right breast  Abnormal mammogram      Plan  Jan 2021: RIGHT; CNB with sclerosing adenosis with microcalcs  Feb 2023: LEFT; asymmetry  Feb 2024: LEFT; new calcs without asymmetry seen     1. RIGHT breast sclerosis adenosis diagnosed Jan 2021.  This area has been monitored for 2 years and is unchanged.  Therefore, her right breast should continue just with her yearly screening mammograms.  Most recent screening mammogram of the right breast was benign.  2. With her family history of a first-degree relative with breast cancer and the diagnosis of sclerosing adenosis, this does put her at a slightly increased risk of breast cancer. Since her lifetime risk Carli score is less than 20%, she would not qualify for breast MRI.   3.  The asymmetry previously seen on her left breast is no longer visible by mammogram.  4.  Most recent screening mammogram of the left breast shows new calcifications.  Diagnostic mammogram evaluation did not show concerns for underlying malignancy; however, did recommend short-term follow-up.  Will schedule for a diagnostic left mammogram in 6 months.  5.  She will return to the office in 6 months after her left diagnostic mammogram.  6.  She is encouraged to do her self breast exams, and contact the office if she identifies any abnormalities.      Mally French MD, FACS  [facility] General Surgery  [address];   [address]  [place]   [address] [phone]

## 2024-02-21 NOTE — LETTER
"February 21, 2024     Kandis Lala DO  [address] [facility] [address]  [facility] [address]    Patient: Brie Neumann   YOB: 1955   Date of Visit: 2/21/2024       Dear Dr. Kandis Lala DO:    Thank you for referring Brie Neumann to me for evaluation. Below are my notes for this consultation.  If you have questions, please do not hesitate to call me. I look forward to following your patient along with you.       Sincerely,     Mally French MD      CC: No Recipients  ______________________________________________________________________________________        GENERAL SURGERY OFFICE NOTE    Patient: Brie Neumann    Age: 68 y.o.   Gender: female    MRN: [id number]    PCP: Kandis Lala DO        SUBJECTIVE     Chief Complaint  Follow-up (Patient is here for 6 month breast follow up.  Patient states no new findings. )       HANH Baird returns to the office for a 6-month follow-up of her left breast asymmetry identified 6 months ago.  She had a follow-up screening mammogram of both breasts recently.  She has known calcifications of the right breast which previously had undergone a core needle biopsy with benign results.  These calcifications have been stable over 2 years.  The asymmetry of the left breast that was identified 6 months ago, is no longer visible on her mammogram.  However, new left-sided calcifications were identified.  Therefore, she underwent a diagnostic mammogram of her left breast which showed \"loose calcifications\" in the left breast.  Short-term follow-up was recommended.  Biopsy was not recommended.  She currently does not have any new breast complaints.  No pain, no palpable masses, no skin changes and no nipple discharge.  She is not having any other significant health issues or changes since her last office visit.  She is anticipating her second grandson within the next 1 to 2 months.     Risk factors for breast cancer: 65-year-old white " female; menarche at age 12 or 13; first live birth at age 30; 2 previous breast biopsies; she had a sister who  at age 52 of metastatic cancer which may, or may not, have been breast cancer. She does state that her mother  of bone cancer at age 57 but also does not know if this was breast cancer, or not. This gives her a 5-year Carli score of 5% and a lifetime risk of 17.8% assuming one first-degree relative had breast cancer. If no first-degree relative had breast cancer, she still has a higher risk score with a 5-year risk score of 3.4% and a lifetime risk of 12.6%. Overall this puts her in a higher than average risk breast cancer category. Right breast biopsy diagnosis with sclerosing adenosis is also a marker for increased risk of breast cancer.     ROS  Review of Systems   Constitutional: no fever~and~no chills.   Eyes: no loss of vision,~no discharge from the eyes,~no itching of the eyes~and~no eye pain.   ENT: no hearing loss,~no neck pain~and~no hoarseness.   Cardiovascular: no chest pain,~no palpitations~and~no lower extremity edema.   Respiratory: no dyspnea,~no dyspnea during exertion~and~no cough.   Breast: no nipple discharge,~no breast mass,~no pain in breast,~no erythema,~no change in breast skin~and~no axillary adenopathy.   Gastrointestinal: no abdominal pain,~no vomiting,~no nausea.   Genitourinary: no dysuria~and~no hematuria.   Musculoskeletal: no arthralgias~and~no myalgias.   Integumentary: no rashes~and~no skin lesions.   Neurological: no headache,~no dizziness~and~no numbness.   Psychiatric: no anxiety,~no depression~and~no emotional problems.   Endocrine: no heat or cold intolerance~and~no increased thirst.   Hematologic/Lymphatic: no tendency for easy bleeding~and~no tendency for easy bruising.   All other systems have been reviewed and are negative for complaint.     HISTORY     Past Medical History:   Diagnosis Date   • Personal history of other endocrine, nutritional and metabolic  "disease     History of hypothyroidism        Past Surgical History:   Procedure Laterality Date   • INCISIONAL BREAST BIOPSY Bilateral     benign bilat breast bx   • OTHER SURGICAL HISTORY  12/13/2019    Breast biopsy        No Known Allergies     Social History     Tobacco Use   Smoking Status Never   Smokeless Tobacco Never        Social History     Substance and Sexual Activity   Alcohol Use Never        HOME MEDICATIONS  Current Outpatient Medications   Medication Instructions   • Ca carb-D3-argnin-inos-silicon (Bone Density Calcium Plus D) 300-200-37.5 mg-unit-mg tablet 1 tablet, oral, Daily   • cholecalciferol (VITAMIN D-3) 50 mcg, oral, Daily   • coenzyme Q10 400 mg capsule 1 capsule, oral, Daily   • fish oil concentrate (Omega-3) 120-180 mg capsule 1 g, oral, Daily   • levothyroxine (SYNTHROID, LEVOXYL) 25 mcg, oral, Daily   • multivitamin tablet 1 tablet, oral, Daily          OBJECTIVE   Last Recorded Vitals.  Blood pressure 137/85, pulse 67, height 1.6 m (5' 3\"), weight 56.7 kg (125 lb), SpO2 95 %.     PHYSICAL EXAM  Physical Exam   General: Well-developed, well-nourished and in no acute distress.  Head: Normocephalic. Atraumatic.  Neck/thyroid: Neck is supple. Normal thyroid without mass. No jugular venous distention.  Eyes: Pupils equal round and reactive to light. Conjunctiva normal.  ENMT: No masses or deformity of external nose. External ears without masses.  Respiratory/Chest: Normal respiratory effort.  Breast: Symmetrical bilateral small breast. No palpable masses. Right breast has scar from biopsy at the 9 o'clock position. No palpable masses, but some dense breast tissue especially of the upper outer quadrant. Left breast has scar from biopsy at 12 o'clock position. No palpable mass, but there is some dense breast tissue especially of the upper outer quadrant. There is a 2 x 2 centimeter lipoma just outside the inferior lateral aspect of the right breast tissue.  Lymphatics: No palpable " lymphadenopathy of the cervical, supraclavicular or axillary regions.  Cardiovascular: Regular rate and rhythm.   Abdomen: Soft, nontender, nondistended. No hernias. No hepatomegaly, splenomegaly or palpable masses.  Rectal: Deferred  : Normal external genitalia  Musculoskeletal: Joints and limbs are grossly normal. Normal gait. Normal range of motion of major joints.  Neuro: Oriented to person, place and time. No obvious neurological deficit. Motor strength grossly normal.  Psych: Normal mood and affect.     RESULTS   Labs  No results found for this or any previous visit (from the past 24 hour(s)).    Radiology [facility]  MAMMO BILATERAL SCREENING TOMOSYNTHESIS;  2/7/2024 9:19 am      ACCESSION NUMBER(S):  [id number]      ORDERING CLINICIAN:  DEEPALI DEL ANGEL      INDICATION:  Screening.      COMPARISON:  Multiple prior examinations dating back to 09/06/2016      FINDINGS:  2D and tomosynthesis images were reviewed at 1 mm slice thickness.      Density:  There are areas of scattered fibroglandular tissue.      Scarring both breasts. 9 mm asymmetry left breast posterior depth cc  projection stable.      There are scattered calcifications throughout both breasts.      A small cluster of calcifications identified of the left breast  posterior depth MLO projection just inferior to the posterior nipple  line indeterminate. Spot magnification compression views recommended.      Biopsy clip identified within the right breast.      IMPRESSION:  Indeterminate calcifications posterior depth left breast.      Right breast is stable.      BI-RADS CATEGORY:      BI-RADS Category:  0 Incomplete; Need Additional Imaging Evaluation  and/or Prior Mammograms for Comparison. Recommendation:  Additional  Imaging. Recommended Date:  Immediate.  Laterality:  Left.    MAMMO LEFT DIAGNOSTIC;  2/13/2024 2:57 pm      ACCESSION NUMBER(S):  [id number]      ORDERING CLINICIAN:  DEEPALI DEL ANGEL      INDICATION:  Signs/Symptoms:Left breast  cluster of calcifications on screening  mammogram.      COMPARISON:  02/07/2024      FINDINGS:  2D and tomosynthesis images were reviewed at 1 mm slice thickness.      Density:  There are areas of scattered fibroglandular tissue.      Scarring left breast.      There are 2 sets of loosening cluster calcifications identified  appearing coarsened probably benign. No associated mass. Continued  surveillance is recommended.          IMPRESSION:  Probable benign calcifications left breast. Follow-up examination  recommended starting date 02/07/2024.      BI-RADS CATEGORY:      BI-RADS Category:  3 Probably Benign.  Recommendation:  Short-term Interval Follow-up Imaging.  Recommended Date:  6 Months.  Laterality:  Left.      For any future breast imaging appointments, please call [phone]  ([phone]).          MACRO:  None      Signed by: Joo Cope 2/13/2024 3:19 PM  Dictation workstation:   [id number]      ASSESSMENT / PLAN   Diagnoses and all orders for this visit:  Calcification of left breast  -     BI mammo left diagnostic tomosynthesis; Future  Calcification of right breast  Sclerosing adenosis of right breast  Abnormal mammogram      Plan  Jan 2021: RIGHT; CNB with sclerosing adenosis with microcalcs  Feb 2023: LEFT; asymmetry  Feb 2024: LEFT; new calcs without asymmetry seen     1. RIGHT breast sclerosis adenosis diagnosed Jan 2021.  This area has been monitored for 2 years and is unchanged.  Therefore, her right breast should continue just with her yearly screening mammograms.  Most recent screening mammogram of the right breast was benign.  2. With her family history of a first-degree relative with breast cancer and the diagnosis of sclerosing adenosis, this does put her at a slightly increased risk of breast cancer. Since her lifetime risk Carli score is less than 20%, she would not qualify for breast MRI.   3.  The asymmetry previously seen on her left breast is no longer visible by mammogram.  4.  Most  recent screening mammogram of the left breast shows new calcifications.  Diagnostic mammogram evaluation did not show concerns for underlying malignancy; however, did recommend short-term follow-up.  Will schedule for a diagnostic left mammogram in 6 months.  5.  She will return to the office in 6 months after her left diagnostic mammogram.  6.  She is encouraged to do her self breast exams, and contact the office if she identifies any abnormalities.      Mally French MD, FACS  [facility]  [address] [place] [address];   [address]  [place]  [address] [phone]

## 2024-02-21 NOTE — PATIENT INSTRUCTIONS
1. Continue with your monthly self breast exams. If you identify any new masses, please call Dr. French's office for evaluation at [phone].  2. There are no concerns for cancer on the right.  3. The asymmetry in your left breast mammogram seen 6 months ago, is no longer seen on the most recent mammogram.  However, you do have new calcifications of your left breast that do need ongoing monitoring.  You will be scheduled for a left diagnostic mammogram in 6 months.  Follow-up in Dr. French's office after this mammogram.

## 2024-05-21 ENCOUNTER — TELEPHONE (OUTPATIENT)
Dept: PRIMARY CARE | Facility: CLINIC | Age: 69
End: 2024-05-21
Payer: MEDICARE

## 2024-05-21 DIAGNOSIS — R05.9 COUGH, UNSPECIFIED TYPE: Primary | ICD-10-CM

## 2024-05-21 NOTE — TELEPHONE ENCOUNTER
Patient has had a persistent cough for about 3 days unsure if it is a cold or allergies. Patient wants to know if something can be called in for the cough, patient does not have a fever     OTC: mucinex and cough medicines

## 2024-05-22 ENCOUNTER — LAB (OUTPATIENT)
Dept: LAB | Facility: LAB | Age: 69
End: 2024-05-22
Payer: MEDICARE

## 2024-05-22 DIAGNOSIS — E78.2 MIXED HYPERLIPIDEMIA: Chronic | ICD-10-CM

## 2024-05-22 DIAGNOSIS — R73.03 PREDIABETES: ICD-10-CM

## 2024-05-22 DIAGNOSIS — E03.9 ACQUIRED HYPOTHYROIDISM: Chronic | ICD-10-CM

## 2024-05-22 LAB
ALBUMIN SERPL BCP-MCNC: 4.2 G/DL (ref 3.4–5)
ALP SERPL-CCNC: 63 U/L (ref 33–136)
ALT SERPL W P-5'-P-CCNC: 18 U/L (ref 7–45)
ANION GAP SERPL CALC-SCNC: 11 MMOL/L (ref 10–20)
AST SERPL W P-5'-P-CCNC: 24 U/L (ref 9–39)
BILIRUB SERPL-MCNC: 0.4 MG/DL (ref 0–1.2)
BUN SERPL-MCNC: 14 MG/DL (ref 6–23)
CALCIUM SERPL-MCNC: 8.6 MG/DL (ref 8.6–10.3)
CHLORIDE SERPL-SCNC: 100 MMOL/L (ref 98–107)
CHOLEST SERPL-MCNC: 198 MG/DL (ref 0–199)
CHOLESTEROL/HDL RATIO: 2.3
CO2 SERPL-SCNC: 30 MMOL/L (ref 21–32)
CREAT SERPL-MCNC: 0.71 MG/DL (ref 0.5–1.05)
EGFRCR SERPLBLD CKD-EPI 2021: >90 ML/MIN/1.73M*2
EST. AVERAGE GLUCOSE BLD GHB EST-MCNC: 134 MG/DL
GLUCOSE SERPL-MCNC: 104 MG/DL (ref 74–99)
HBA1C MFR BLD: 6.3 %
HDLC SERPL-MCNC: 87.5 MG/DL
LDLC SERPL CALC-MCNC: 95 MG/DL
NON HDL CHOLESTEROL: 111 MG/DL (ref 0–149)
POTASSIUM SERPL-SCNC: 4.2 MMOL/L (ref 3.5–5.3)
PROT SERPL-MCNC: 7 G/DL (ref 6.4–8.2)
SODIUM SERPL-SCNC: 137 MMOL/L (ref 136–145)
TRIGL SERPL-MCNC: 76 MG/DL (ref 0–149)
TSH SERPL-ACNC: 0.84 MIU/L (ref 0.44–3.98)
VLDL: 15 MG/DL (ref 0–40)

## 2024-05-22 PROCEDURE — 83036 HEMOGLOBIN GLYCOSYLATED A1C: CPT

## 2024-05-22 PROCEDURE — 36415 COLL VENOUS BLD VENIPUNCTURE: CPT

## 2024-05-22 PROCEDURE — 80061 LIPID PANEL: CPT

## 2024-05-22 PROCEDURE — 80053 COMPREHEN METABOLIC PANEL: CPT

## 2024-05-22 PROCEDURE — 84443 ASSAY THYROID STIM HORMONE: CPT

## 2024-05-22 RX ORDER — LORATADINE 10 MG/1
10 TABLET ORAL DAILY
Qty: 30 TABLET | Refills: 0 | Status: SHIPPED | OUTPATIENT
Start: 2024-05-22 | End: 2025-05-22

## 2024-05-22 RX ORDER — BENZONATATE 200 MG/1
200 CAPSULE ORAL 3 TIMES DAILY PRN
Qty: 42 CAPSULE | Refills: 0 | Status: SHIPPED | OUTPATIENT
Start: 2024-05-22 | End: 2024-06-21

## 2024-05-22 NOTE — TELEPHONE ENCOUNTER
Tessalon and claritin called in. Also recommend tea with honey. Appt if symptoms persist without improvement beyond 10 days. If fever recommend COVID testing.

## 2024-05-24 ENCOUNTER — TELEPHONE (OUTPATIENT)
Dept: PRIMARY CARE | Facility: CLINIC | Age: 69
End: 2024-05-24
Payer: MEDICARE

## 2024-05-24 NOTE — TELEPHONE ENCOUNTER
The medication seemed to be working and the cough is productive - yellowy phlegm  Covid home test - Negative   Does she need a anything else to take?    She has an appointment 5/29

## 2024-05-29 ENCOUNTER — OFFICE VISIT (OUTPATIENT)
Dept: PRIMARY CARE | Facility: CLINIC | Age: 69
End: 2024-05-29
Payer: MEDICARE

## 2024-05-29 VITALS
HEIGHT: 63 IN | OXYGEN SATURATION: 99 % | HEART RATE: 69 BPM | DIASTOLIC BLOOD PRESSURE: 64 MMHG | SYSTOLIC BLOOD PRESSURE: 122 MMHG | BODY MASS INDEX: 21.44 KG/M2 | WEIGHT: 121 LBS

## 2024-05-29 DIAGNOSIS — E03.9 ACQUIRED HYPOTHYROIDISM: Chronic | ICD-10-CM

## 2024-05-29 DIAGNOSIS — R73.03 PREDIABETES: Primary | ICD-10-CM

## 2024-05-29 DIAGNOSIS — J18.9 PNEUMONIA OF RIGHT UPPER LOBE DUE TO INFECTIOUS ORGANISM: ICD-10-CM

## 2024-05-29 DIAGNOSIS — E78.2 MIXED HYPERLIPIDEMIA: ICD-10-CM

## 2024-05-29 PROCEDURE — 1157F ADVNC CARE PLAN IN RCRD: CPT | Performed by: STUDENT IN AN ORGANIZED HEALTH CARE EDUCATION/TRAINING PROGRAM

## 2024-05-29 PROCEDURE — 99214 OFFICE O/P EST MOD 30 MIN: CPT | Performed by: STUDENT IN AN ORGANIZED HEALTH CARE EDUCATION/TRAINING PROGRAM

## 2024-05-29 PROCEDURE — 1159F MED LIST DOCD IN RCRD: CPT | Performed by: STUDENT IN AN ORGANIZED HEALTH CARE EDUCATION/TRAINING PROGRAM

## 2024-05-29 PROCEDURE — 1036F TOBACCO NON-USER: CPT | Performed by: STUDENT IN AN ORGANIZED HEALTH CARE EDUCATION/TRAINING PROGRAM

## 2024-05-29 PROCEDURE — 1160F RVW MEDS BY RX/DR IN RCRD: CPT | Performed by: STUDENT IN AN ORGANIZED HEALTH CARE EDUCATION/TRAINING PROGRAM

## 2024-05-29 RX ORDER — LEVOTHYROXINE SODIUM 25 UG/1
25 TABLET ORAL DAILY
Qty: 90 TABLET | Refills: 1 | Status: SHIPPED | OUTPATIENT
Start: 2024-05-29 | End: 2024-05-29

## 2024-05-29 RX ORDER — LEVOTHYROXINE SODIUM 25 UG/1
12.5 TABLET ORAL DAILY
Qty: 45 TABLET | Refills: 1 | Status: SHIPPED | OUTPATIENT
Start: 2024-05-29 | End: 2024-11-25

## 2024-05-29 RX ORDER — AMOXICILLIN AND CLAVULANATE POTASSIUM 875; 125 MG/1; MG/1
875 TABLET, FILM COATED ORAL 2 TIMES DAILY
Qty: 20 TABLET | Refills: 0 | Status: SHIPPED | OUTPATIENT
Start: 2024-05-29 | End: 2024-06-08

## 2024-05-29 ASSESSMENT — ENCOUNTER SYMPTOMS
OCCASIONAL FEELINGS OF UNSTEADINESS: 0
FEVER: 0
CONFUSION: 0
COUGH: 1
SHORTNESS OF BREATH: 0
APPETITE CHANGE: 0
HEADACHES: 0
LIGHT-HEADEDNESS: 0
WHEEZING: 0
LOSS OF SENSATION IN FEET: 0
DEPRESSION: 0
FATIGUE: 1

## 2024-05-29 ASSESSMENT — COLUMBIA-SUICIDE SEVERITY RATING SCALE - C-SSRS
1. IN THE PAST MONTH, HAVE YOU WISHED YOU WERE DEAD OR WISHED YOU COULD GO TO SLEEP AND NOT WAKE UP?: NO
6. HAVE YOU EVER DONE ANYTHING, STARTED TO DO ANYTHING, OR PREPARED TO DO ANYTHING TO END YOUR LIFE?: NO
2. HAVE YOU ACTUALLY HAD ANY THOUGHTS OF KILLING YOURSELF?: NO

## 2024-05-29 ASSESSMENT — PATIENT HEALTH QUESTIONNAIRE - PHQ9
2. FEELING DOWN, DEPRESSED OR HOPELESS: NOT AT ALL
SUM OF ALL RESPONSES TO PHQ9 QUESTIONS 1 AND 2: 0
1. LITTLE INTEREST OR PLEASURE IN DOING THINGS: NOT AT ALL

## 2024-05-29 NOTE — PROGRESS NOTES
"Patient Name:  Brie Neumann  MRN:  [id number]  :  1955    Subjective   Patient ID: Brie Neumann is a 68 y.o. female who presents for Follow-up (Pt here for follow up, go over labs).    HPI     69 yo female presents for follow up and to review recent labs     TSH is WNL  CMP Fasting   A1c 6.3  Improving lipid panel 242-198  134 LDL 95    Ongoing cough, becoming more bothersome, rattling, thick productive     Review of Systems   Constitutional:  Positive for fatigue. Negative for appetite change and fever.   HENT:  Positive for congestion.    Respiratory:  Positive for cough. Negative for shortness of breath and wheezing.    Cardiovascular:  Negative for chest pain and leg swelling.   Allergic/Immunologic: Negative for immunocompromised state.   Neurological:  Negative for light-headedness and headaches.   Psychiatric/Behavioral:  Negative for confusion.      Objective   /64 (BP Location: Left arm, Patient Position: Sitting)   Pulse 69   Ht 1.6 m (5' 3\")   Wt 54.9 kg (121 lb)   SpO2 99%   BMI 21.43 kg/m²     Physical Exam  Constitutional:       Appearance: Normal appearance.   HENT:      Head: Normocephalic and atraumatic.   Cardiovascular:      Rate and Rhythm: Normal rate and regular rhythm.   Pulmonary:      Effort: Pulmonary effort is normal.      Comments: RUL coarse breath sounds  Skin:     General: Skin is warm and dry.      Coloration: Skin is not jaundiced or pale.   Neurological:      General: No focal deficit present.      Mental Status: She is alert and oriented to person, place, and time.   Psychiatric:         Mood and Affect: Mood normal.         Behavior: Behavior normal.     Assessment/Plan   Problem List Items Addressed This Visit             ICD-10-CM    Acquired hypothyroidism (Chronic) E03.9     Thyroid labs WNL in , continue present dosing of levothyroxine 25mcg          Relevant Medications    levothyroxine (Synthroid, Levoxyl) 25 mcg tablet    Prediabetes - " Primary (Chronic) R73.03     6.2-6.3  Recommend working on diet and exercise to improve these numbers          Relevant Orders    Follow Up In [facility] - PCP - Established    Referral to Diabetes Education    Mixed hyperlipidemia (Chronic) E78.2     Improving numbers with lifestyle changes, continue the good work   Red Yeast Rice           Other Visit Diagnoses         Codes    Pneumonia of right upper lobe due to infectious organism     J18.9    Relevant Medications    amoxicillin-pot clavulanate (Augmentin) 875-125 mg tablet

## 2024-05-29 NOTE — ASSESSMENT & PLAN NOTE
2022 DEXA- Femoral neck -1.5, lumbar spine -2.4--> due October of 2024  1.  1200 mg - 1500 mg calcium per day if no history of renal calculi for   adults 50 years and over.  2.  800 - 1000 International Units of vitamin D3 per day if no history of   renal calculi for adults 50 years and over.  3.  Weight bearing exercise  4.  Advise again smoking.  If currently smoking, recommend cessation.  5.  Avoid excessive use of caffeine, soft drinks, and alcoholic beverages.

## 2024-06-13 DIAGNOSIS — R05.9 COUGH, UNSPECIFIED TYPE: ICD-10-CM

## 2024-06-13 RX ORDER — LORATADINE 10 MG/1
10 TABLET ORAL DAILY
Qty: 90 TABLET | Refills: 3 | Status: SHIPPED | OUTPATIENT
Start: 2024-06-13

## 2024-06-18 ENCOUNTER — APPOINTMENT (OUTPATIENT)
Dept: NUTRITION | Facility: CLINIC | Age: 69
End: 2024-06-18
Payer: MEDICARE

## 2024-07-22 DIAGNOSIS — E03.9 ACQUIRED HYPOTHYROIDISM: ICD-10-CM

## 2024-07-22 DIAGNOSIS — R73.03 PREDIABETES: Primary | ICD-10-CM

## 2024-07-22 DIAGNOSIS — E78.2 MIXED HYPERLIPIDEMIA: ICD-10-CM

## 2024-08-13 ENCOUNTER — ANCILLARY ORDERS (OUTPATIENT)
Dept: SURGERY | Facility: CLINIC | Age: 69
End: 2024-08-13
Payer: MEDICARE

## 2024-08-13 ENCOUNTER — HOSPITAL ENCOUNTER (OUTPATIENT)
Dept: RADIOLOGY | Facility: HOSPITAL | Age: 69
Discharge: HOME | End: 2024-08-13
Payer: MEDICARE

## 2024-08-13 DIAGNOSIS — R92.8 ABNORMAL MAMMOGRAM: ICD-10-CM

## 2024-08-13 DIAGNOSIS — N60.21 SCLEROSING ADENOSIS OF RIGHT BREAST: ICD-10-CM

## 2024-08-13 DIAGNOSIS — R92.1 CALCIFICATION OF LEFT BREAST: Primary | ICD-10-CM

## 2024-08-13 DIAGNOSIS — R92.1 CALCIFICATION OF RIGHT BREAST: ICD-10-CM

## 2024-08-13 DIAGNOSIS — R92.1 CALCIFICATION OF LEFT BREAST: ICD-10-CM

## 2024-08-13 PROCEDURE — 77065 DX MAMMO INCL CAD UNI: CPT | Mod: LT

## 2024-08-13 PROCEDURE — 77065 DX MAMMO INCL CAD UNI: CPT | Mod: LEFT SIDE

## 2024-08-14 ENCOUNTER — TELEPHONE (OUTPATIENT)
Dept: PRIMARY CARE | Facility: CLINIC | Age: 69
End: 2024-08-14
Payer: MEDICARE

## 2024-08-14 NOTE — TELEPHONE ENCOUNTER
----- Message from Kandis Lala sent at 8/14/2024  7:27 AM EDT -----  Review of mammogram- stable calcification of the L breast. Radiology recommends recheck at 6 month interval. Orders are placed for scheduling.

## 2024-08-20 ENCOUNTER — APPOINTMENT (OUTPATIENT)
Dept: SURGERY | Facility: CLINIC | Age: 69
End: 2024-08-20
Payer: MEDICARE

## 2024-08-23 ENCOUNTER — APPOINTMENT (OUTPATIENT)
Dept: SURGERY | Facility: CLINIC | Age: 69
End: 2024-08-23
Payer: MEDICARE

## 2024-08-23 VITALS
SYSTOLIC BLOOD PRESSURE: 134 MMHG | DIASTOLIC BLOOD PRESSURE: 76 MMHG | WEIGHT: 122.4 LBS | OXYGEN SATURATION: 93 % | HEART RATE: 67 BPM | BODY MASS INDEX: 21.69 KG/M2 | HEIGHT: 63 IN

## 2024-08-23 DIAGNOSIS — R92.8 ABNORMAL MAMMOGRAM: ICD-10-CM

## 2024-08-23 DIAGNOSIS — Z12.31 ENCOUNTER FOR SCREENING MAMMOGRAM FOR BREAST CANCER: ICD-10-CM

## 2024-08-23 DIAGNOSIS — R92.1 CALCIFICATION OF LEFT BREAST: Primary | ICD-10-CM

## 2024-08-23 DIAGNOSIS — R92.1 CALCIFICATION OF RIGHT BREAST: ICD-10-CM

## 2024-08-23 DIAGNOSIS — N60.21 SCLEROSING ADENOSIS OF RIGHT BREAST: ICD-10-CM

## 2024-08-23 PROCEDURE — 3008F BODY MASS INDEX DOCD: CPT | Performed by: SURGERY

## 2024-08-23 PROCEDURE — 1157F ADVNC CARE PLAN IN RCRD: CPT | Performed by: SURGERY

## 2024-08-23 PROCEDURE — 1036F TOBACCO NON-USER: CPT | Performed by: SURGERY

## 2024-08-23 PROCEDURE — 1159F MED LIST DOCD IN RCRD: CPT | Performed by: SURGERY

## 2024-08-23 PROCEDURE — 99213 OFFICE O/P EST LOW 20 MIN: CPT | Performed by: SURGERY

## 2024-08-23 PROCEDURE — 1160F RVW MEDS BY RX/DR IN RCRD: CPT | Performed by: SURGERY

## 2024-08-23 NOTE — PATIENT INSTRUCTIONS
"1. Continue with your monthly self breast exams. If you identify any new masses, please call Dr. French's office for evaluation at [phone].  2.  The calcifications and \"abnormalities\" of your left breast appear stable by x-ray and physical exam.  You will be due for a mammogram of both breast in 6 months.  Follow-up in Dr. French's office after this mammogram.  "

## 2024-08-23 NOTE — PROGRESS NOTES
"    GENERAL SURGERY OFFICE NOTE    Patient: Brie Neumann    Age: 69 y.o.   Gender: female    MRN: [id number]    PCP: Kandis Lala, DO        SUBJECTIVE     Chief Complaint  Follow-up (Patient is here for a 6 month left breast follow up. Patient states that she is not currently having any problems with either breast.)       HPI  Brie returns to the office for a  1 - year follow-up of her left breast asymmetry and 6-month follow-up of left breast calcifications.  Over the last 2+ years, she has had both calcifications and asymmetries which have been monitored.  In the last 6 months, she has not noticed any new breast issues.  No palpable masses, skin changes or nipple discharge.  She recently underwent a left diagnostic mammogram which showed that the calcifications are unchanged.  Her biggest complaint today is that of feeling a little bit \"off balance\" throughout the day.  It is not necessarily when she stands up.  She still remains very active and bikes approximately 5 miles every single day without problems.  She was concerned as her sister was recently diagnosed with a \"mass\" near her pituitary.  She has not discussed this with her primary care physician yet.     Risk factors for breast cancer: 69-year-old white female; menarche at age 12 or 13; first live birth at age 30; 2 previous breast biopsies; she had a sister who  at age 52 of metastatic cancer which may, or may not, have been breast cancer. She does state that her mother  of bone cancer at age 57 but also does not know if this was breast cancer, or not. This gives her a 5-year Carli score of 5% and a lifetime risk of 17.8% assuming one first-degree relative had breast cancer. If no first-degree relative had breast cancer, she still has a higher risk score with a 5-year risk score of 3.4% and a lifetime risk of 12.6%. Overall this puts her in a higher than average risk breast cancer category. Right breast biopsy diagnosis with sclerosing " adenosis is also a marker for increased risk of breast cancer.     ROS  Review of Systems   Constitutional: no fever~and~no chills.   Eyes: no loss of vision,~no discharge from the eyes,~no itching of the eyes~and~no eye pain.   ENT: no hearing loss,~no neck pain~and~no hoarseness.   Cardiovascular: no chest pain,~no palpitations~and~no lower extremity edema.   Respiratory: no dyspnea,~no dyspnea during exertion~and~no cough.   Breast: no nipple discharge,~no breast mass,~no pain in breast,~no erythema,~no change in breast skin~and~no axillary adenopathy.   Gastrointestinal: no abdominal pain,~no vomiting,~no nausea.   Genitourinary: no dysuria~and~no hematuria.   Musculoskeletal: no arthralgias~and~no myalgias.   Integumentary: no rashes~and~no skin lesions.   Neurological: no headache,~no dizziness~and~no numbness.   Psychiatric: no anxiety,~no depression~and~no emotional problems.   Endocrine: no heat or cold intolerance~and~no increased thirst.   Hematologic/Lymphatic: no tendency for easy bleeding~and~no tendency for easy bruising.   All other systems have been reviewed and are negative for complaint.     HISTORY     Past Medical History:   Diagnosis Date    Personal history of other endocrine, nutritional and metabolic disease     History of hypothyroidism        Past Surgical History:   Procedure Laterality Date    INCISIONAL BREAST BIOPSY Bilateral     benign bilat breast bx    OTHER SURGICAL HISTORY  12/13/2019    Breast biopsy        No Known Allergies     Social History     Tobacco Use   Smoking Status Never   Smokeless Tobacco Never        Social History     Substance and Sexual Activity   Alcohol Use Never        HOME MEDICATIONS  Current Outpatient Medications   Medication Instructions    Ca carb-D3-argnin-inos-silicon (Bone Density Calcium Plus D) 300-200-37.5 mg-unit-mg tablet 1 tablet, oral, Daily    cholecalciferol (VITAMIN D-3) 50 mcg, oral, Daily    coenzyme Q10 400 mg capsule 1 capsule, oral,  "Daily    fish oil concentrate (Omega-3) 120-180 mg capsule 1 g, oral, Daily    levothyroxine (SYNTHROID, LEVOXYL) 12.5 mcg, oral, Daily    loratadine (CLARITIN) 10 mg, oral, Daily    multivitamin tablet 1 tablet, oral, Daily          OBJECTIVE   Last Recorded Vitals.  Blood pressure 134/76, pulse 67, height 1.6 m (5' 3\"), weight 55.5 kg (122 lb 6.4 oz), SpO2 93%.     PHYSICAL EXAM  Physical Exam   General: Well-developed, well-nourished and in no acute distress.  Head: Normocephalic. Atraumatic.  Neck/thyroid: Neck is supple. Normal thyroid without mass. No jugular venous distention.  Eyes: Pupils equal round and reactive to light. Conjunctiva normal.  ENMT: No masses or deformity of external nose. External ears without masses.  Respiratory/Chest: Normal respiratory effort.  Breast: Symmetrical bilateral small breast. No palpable masses. Right breast has scar from biopsy at the 9 o'clock position. No palpable masses, but some dense breast tissue especially of the upper outer quadrant. Left breast has scar from biopsy at 12 o'clock position. No palpable mass, but there is some dense breast tissue especially of the upper outer quadrant. There is a 2 x 2 centimeter lipoma just outside the inferior lateral aspect of the right breast tissue.  Lymphatics: No palpable lymphadenopathy of the cervical, supraclavicular or axillary regions.  Cardiovascular: Regular rate and rhythm.   Abdomen: Soft, nontender, nondistended. No hernias. No hepatomegaly, splenomegaly or palpable masses.  Rectal: Deferred  : Normal external genitalia  Musculoskeletal: Joints and limbs are grossly normal. Normal gait. Normal range of motion of major joints.  Neuro: Oriented to person, place and time. No obvious neurological deficit. Motor strength grossly normal.  Psych: Normal mood and affect.     RESULTS   Labs  No results found for this or any previous visit (from the past 24 hour(s)).    Radiology Resutls  MAMMO LEFT DIAGNOSTIC;  8/13/2024 " 1:29 pm      ACCESSION NUMBER(S):  [id number]      ORDERING CLINICIAN:  DEEPALI DEL ANGEL      INDICATION:  Signs/Symptoms:6-month follow-up of new left breast calcifications.          COMPARISON:  02/13/2024, 02/07/2024, 08/17/2023, 02/06/2023 and 08/01/2022      FINDINGS:  Density:  The breast tissue is heterogeneously dense, which may  obscure small masses.      Small group of calcifications in the posterior upper-outer quadrant  of the left breast is unchanged. Surgical scar appears unchanged in  the superior left breast also. An asymmetry in the medial posterior  left breast is stable. No areas of suspicious architectural  distortion are noted.      IMPRESSION:  Stable small cluster of calcifications under surveillance in the left  breast. Bilateral examination is 6 months is recommended to continue  surveillance and maintain screening on the right breast.      BI-RADS CATEGORY:      BI-RADS Category:  3 Probably Benign.  Recommendation:  Short-term Interval Follow-up Imaging.  Recommended Date:  6 Months.  Laterality:  Bilateral.      ASSESSMENT / PLAN   Diagnoses and all orders for this visit:  Calcification of left breast  Calcification of right breast  Sclerosing adenosis of right breast  Abnormal mammogram  Encounter for screening mammogram for breast cancer  -     BI mammo bilateral screening tomosynthesis; Future        Plan  Jan 2021: RIGHT; CNB with sclerosing adenosis with microcalcs  Feb 2023: LEFT; asymmetry  Feb 2024: LEFT; new calcs without asymmetry seen     1. RIGHT breast sclerosis adenosis diagnosed Jan 2021.  This area has been monitored for 2 years and is unchanged.  Therefore, her right breast should continue just with her yearly screening mammograms.  Most recent screening mammogram of the right breast was benign.  She will be due for her screening mammogram on the right in 6 months.  2. With her family history of a first-degree relative with breast cancer and the diagnosis of sclerosing  adenosis, this does put her at a slightly increased risk of breast cancer. Since her lifetime risk Carli score is less than 20%, she would not qualify for breast MRI.   3.  The asymmetry previously seen on her left breast is no longer visible by mammogram.  4.  Left breast calcifications appear stable.  Will reevaluate again in 6 months when she is due for her yearly screening mammogram.  If this mammogram does not identify any changes in the calcifications, she may go back to her yearly screening mammograms at that time.  5.  She will return to the office in 6 months after her bilateral screening mammogram.  6.  She is encouraged to do her self breast exams, and contact the office if she identifies any abnormalities.      Mally French MD, FACS  [facility] General Surgery  [address] [place] [address];   [facility] [address]  [place]  [address] [phone]

## 2024-11-14 ENCOUNTER — TELEPHONE (OUTPATIENT)
Dept: PRIMARY CARE | Facility: CLINIC | Age: 69
End: 2024-11-14
Payer: MEDICARE

## 2024-11-14 NOTE — TELEPHONE ENCOUNTER
She has a cough , sometimes productive (yellow) , no other symptoms.  X 1 1/2 weeks    Asking for your recommendation

## 2024-11-14 NOTE — TELEPHONE ENCOUNTER
"I would recommend mucinex, this is an expectorant that facilitates the removal of secretions from the respiratory tract. She can also get Mucinex DM. The \"DM\" on Mucinex DM products stands for dextromethorphan, which is a cough suppressant. It works to lessen cough by blocking your cough reflex.    "

## 2024-12-02 ENCOUNTER — LAB (OUTPATIENT)
Dept: LAB | Facility: LAB | Age: 69
End: 2024-12-02
Payer: MEDICARE

## 2024-12-02 DIAGNOSIS — E78.2 MIXED HYPERLIPIDEMIA: ICD-10-CM

## 2024-12-02 DIAGNOSIS — E03.9 ACQUIRED HYPOTHYROIDISM: ICD-10-CM

## 2024-12-02 DIAGNOSIS — R73.03 PREDIABETES: ICD-10-CM

## 2024-12-02 LAB
ALBUMIN SERPL BCP-MCNC: 4.1 G/DL (ref 3.4–5)
ALP SERPL-CCNC: 57 U/L (ref 33–136)
ALT SERPL W P-5'-P-CCNC: 21 U/L (ref 7–45)
ANION GAP SERPL CALC-SCNC: 9 MMOL/L (ref 10–20)
AST SERPL W P-5'-P-CCNC: 24 U/L (ref 9–39)
BASOPHILS # BLD AUTO: 0.04 X10*3/UL (ref 0–0.1)
BASOPHILS NFR BLD AUTO: 0.8 %
BILIRUB SERPL-MCNC: 0.4 MG/DL (ref 0–1.2)
BUN SERPL-MCNC: 24 MG/DL (ref 6–23)
CALCIUM SERPL-MCNC: 9.1 MG/DL (ref 8.6–10.3)
CHLORIDE SERPL-SCNC: 104 MMOL/L (ref 98–107)
CHOLEST SERPL-MCNC: 242 MG/DL (ref 0–199)
CHOLESTEROL/HDL RATIO: 2.5
CO2 SERPL-SCNC: 30 MMOL/L (ref 21–32)
CREAT SERPL-MCNC: 0.71 MG/DL (ref 0.5–1.05)
EGFRCR SERPLBLD CKD-EPI 2021: >90 ML/MIN/1.73M*2
EOSINOPHIL # BLD AUTO: 0.12 X10*3/UL (ref 0–0.7)
EOSINOPHIL NFR BLD AUTO: 2.3 %
ERYTHROCYTE [DISTWIDTH] IN BLOOD BY AUTOMATED COUNT: 12.8 % (ref 11.5–14.5)
GLUCOSE SERPL-MCNC: 95 MG/DL (ref 74–99)
HCT VFR BLD AUTO: 40.1 % (ref 36–46)
HDLC SERPL-MCNC: 97 MG/DL
HGB BLD-MCNC: 13 G/DL (ref 12–16)
IMM GRANULOCYTES # BLD AUTO: 0.01 X10*3/UL (ref 0–0.7)
IMM GRANULOCYTES NFR BLD AUTO: 0.2 % (ref 0–0.9)
LDLC SERPL CALC-MCNC: 129 MG/DL
LYMPHOCYTES # BLD AUTO: 2.09 X10*3/UL (ref 1.2–4.8)
LYMPHOCYTES NFR BLD AUTO: 40.5 %
MCH RBC QN AUTO: 29 PG (ref 26–34)
MCHC RBC AUTO-ENTMCNC: 32.4 G/DL (ref 32–36)
MCV RBC AUTO: 90 FL (ref 80–100)
MONOCYTES # BLD AUTO: 0.5 X10*3/UL (ref 0.1–1)
MONOCYTES NFR BLD AUTO: 9.7 %
NEUTROPHILS # BLD AUTO: 2.4 X10*3/UL (ref 1.2–7.7)
NEUTROPHILS NFR BLD AUTO: 46.5 %
NON HDL CHOLESTEROL: 145 MG/DL (ref 0–149)
NRBC BLD-RTO: 0 /100 WBCS (ref 0–0)
PLATELET # BLD AUTO: 253 X10*3/UL (ref 150–450)
POTASSIUM SERPL-SCNC: 3.9 MMOL/L (ref 3.5–5.3)
PROT SERPL-MCNC: 7 G/DL (ref 6.4–8.2)
RBC # BLD AUTO: 4.48 X10*6/UL (ref 4–5.2)
SODIUM SERPL-SCNC: 139 MMOL/L (ref 136–145)
TRIGL SERPL-MCNC: 79 MG/DL (ref 0–149)
TSH SERPL-ACNC: 0.54 MIU/L (ref 0.44–3.98)
VLDL: 16 MG/DL (ref 0–40)
WBC # BLD AUTO: 5.2 X10*3/UL (ref 4.4–11.3)

## 2024-12-02 PROCEDURE — 80053 COMPREHEN METABOLIC PANEL: CPT

## 2024-12-02 PROCEDURE — 84443 ASSAY THYROID STIM HORMONE: CPT

## 2024-12-02 PROCEDURE — 80061 LIPID PANEL: CPT

## 2024-12-02 PROCEDURE — 36415 COLL VENOUS BLD VENIPUNCTURE: CPT

## 2024-12-02 PROCEDURE — 83036 HEMOGLOBIN GLYCOSYLATED A1C: CPT

## 2024-12-02 PROCEDURE — 85025 COMPLETE CBC W/AUTO DIFF WBC: CPT

## 2024-12-03 PROBLEM — R30.0 DYSURIA: Status: ACTIVE | Noted: 2024-12-03

## 2024-12-03 PROBLEM — N30.00 ACUTE CYSTITIS: Status: ACTIVE | Noted: 2024-12-03

## 2024-12-03 PROBLEM — Z86.39 HISTORY OF HYPOTHYROIDISM: Status: ACTIVE | Noted: 2024-12-03

## 2024-12-03 LAB
EST. AVERAGE GLUCOSE BLD GHB EST-MCNC: 126 MG/DL
HBA1C MFR BLD: 6 %

## 2024-12-04 ENCOUNTER — APPOINTMENT (OUTPATIENT)
Dept: PRIMARY CARE | Facility: CLINIC | Age: 69
End: 2024-12-04
Payer: MEDICARE

## 2024-12-04 VITALS
SYSTOLIC BLOOD PRESSURE: 138 MMHG | WEIGHT: 123 LBS | OXYGEN SATURATION: 97 % | BODY MASS INDEX: 21.79 KG/M2 | HEART RATE: 75 BPM | HEIGHT: 63 IN | DIASTOLIC BLOOD PRESSURE: 82 MMHG

## 2024-12-04 DIAGNOSIS — E03.9 ACQUIRED HYPOTHYROIDISM: Chronic | ICD-10-CM

## 2024-12-04 DIAGNOSIS — R73.03 PREDIABETES: ICD-10-CM

## 2024-12-04 DIAGNOSIS — E78.2 MIXED HYPERLIPIDEMIA: ICD-10-CM

## 2024-12-04 DIAGNOSIS — Z00.00 MEDICARE ANNUAL WELLNESS VISIT, SUBSEQUENT: Primary | ICD-10-CM

## 2024-12-04 DIAGNOSIS — Z23 NEEDS FLU SHOT: ICD-10-CM

## 2024-12-04 DIAGNOSIS — M85.89 OSTEOPENIA OF MULTIPLE SITES: ICD-10-CM

## 2024-12-04 DIAGNOSIS — Z78.0 POSTMENOPAUSAL: ICD-10-CM

## 2024-12-04 DIAGNOSIS — J32.9 SINUSITIS, UNSPECIFIED CHRONICITY, UNSPECIFIED LOCATION: ICD-10-CM

## 2024-12-04 PROBLEM — Z86.39 HISTORY OF HYPOTHYROIDISM: Status: RESOLVED | Noted: 2024-12-03 | Resolved: 2024-12-04

## 2024-12-04 PROCEDURE — G0008 ADMIN INFLUENZA VIRUS VAC: HCPCS | Performed by: STUDENT IN AN ORGANIZED HEALTH CARE EDUCATION/TRAINING PROGRAM

## 2024-12-04 PROCEDURE — 1157F ADVNC CARE PLAN IN RCRD: CPT | Performed by: STUDENT IN AN ORGANIZED HEALTH CARE EDUCATION/TRAINING PROGRAM

## 2024-12-04 PROCEDURE — 1170F FXNL STATUS ASSESSED: CPT | Performed by: STUDENT IN AN ORGANIZED HEALTH CARE EDUCATION/TRAINING PROGRAM

## 2024-12-04 PROCEDURE — G0439 PPPS, SUBSEQ VISIT: HCPCS | Performed by: STUDENT IN AN ORGANIZED HEALTH CARE EDUCATION/TRAINING PROGRAM

## 2024-12-04 PROCEDURE — 99214 OFFICE O/P EST MOD 30 MIN: CPT | Performed by: STUDENT IN AN ORGANIZED HEALTH CARE EDUCATION/TRAINING PROGRAM

## 2024-12-04 PROCEDURE — 90677 PCV20 VACCINE IM: CPT | Performed by: STUDENT IN AN ORGANIZED HEALTH CARE EDUCATION/TRAINING PROGRAM

## 2024-12-04 PROCEDURE — 1159F MED LIST DOCD IN RCRD: CPT | Performed by: STUDENT IN AN ORGANIZED HEALTH CARE EDUCATION/TRAINING PROGRAM

## 2024-12-04 PROCEDURE — 90656 IIV3 VACC NO PRSV 0.5 ML IM: CPT | Performed by: STUDENT IN AN ORGANIZED HEALTH CARE EDUCATION/TRAINING PROGRAM

## 2024-12-04 PROCEDURE — 1036F TOBACCO NON-USER: CPT | Performed by: STUDENT IN AN ORGANIZED HEALTH CARE EDUCATION/TRAINING PROGRAM

## 2024-12-04 PROCEDURE — 3008F BODY MASS INDEX DOCD: CPT | Performed by: STUDENT IN AN ORGANIZED HEALTH CARE EDUCATION/TRAINING PROGRAM

## 2024-12-04 PROCEDURE — 1123F ACP DISCUSS/DSCN MKR DOCD: CPT | Performed by: STUDENT IN AN ORGANIZED HEALTH CARE EDUCATION/TRAINING PROGRAM

## 2024-12-04 PROCEDURE — G0009 ADMIN PNEUMOCOCCAL VACCINE: HCPCS | Performed by: STUDENT IN AN ORGANIZED HEALTH CARE EDUCATION/TRAINING PROGRAM

## 2024-12-04 PROCEDURE — 1160F RVW MEDS BY RX/DR IN RCRD: CPT | Performed by: STUDENT IN AN ORGANIZED HEALTH CARE EDUCATION/TRAINING PROGRAM

## 2024-12-04 RX ORDER — AMOXICILLIN AND CLAVULANATE POTASSIUM 875; 125 MG/1; MG/1
875 TABLET, FILM COATED ORAL 2 TIMES DAILY
Qty: 20 TABLET | Refills: 0 | Status: SHIPPED | OUTPATIENT
Start: 2024-12-04 | End: 2024-12-14

## 2024-12-04 ASSESSMENT — ENCOUNTER SYMPTOMS
SINUS PAIN: 1
WEAKNESS: 0
SINUS PRESSURE: 1
TROUBLE SWALLOWING: 0
FATIGUE: 0
FACIAL ASYMMETRY: 0
ACTIVITY CHANGE: 0
DIZZINESS: 0
LIGHT-HEADEDNESS: 1
HEADACHES: 0
WHEEZING: 0
OCCASIONAL FEELINGS OF UNSTEADINESS: 0
RHINORRHEA: 1
NUMBNESS: 0
COUGH: 0
FEVER: 0
TREMORS: 0
SHORTNESS OF BREATH: 0
LOSS OF SENSATION IN FEET: 0
SEIZURES: 0
SPEECH DIFFICULTY: 0

## 2024-12-04 ASSESSMENT — COLUMBIA-SUICIDE SEVERITY RATING SCALE - C-SSRS
2. HAVE YOU ACTUALLY HAD ANY THOUGHTS OF KILLING YOURSELF?: NO
6. HAVE YOU EVER DONE ANYTHING, STARTED TO DO ANYTHING, OR PREPARED TO DO ANYTHING TO END YOUR LIFE?: NO
1. IN THE PAST MONTH, HAVE YOU WISHED YOU WERE DEAD OR WISHED YOU COULD GO TO SLEEP AND NOT WAKE UP?: NO

## 2024-12-04 ASSESSMENT — ACTIVITIES OF DAILY LIVING (ADL)
BATHING: INDEPENDENT
DRESSING: INDEPENDENT

## 2024-12-04 NOTE — ASSESSMENT & PLAN NOTE
Improving 6.3 to 6  Continue to work on diet modifications  Orders:    Follow Up In [facility] - PCP - Established

## 2024-12-04 NOTE — ASSESSMENT & PLAN NOTE
PNEUMONIA vaccine- Ordered  SHINGLES vaccine- Completed  INFLUENZA vaccine- patient requests non high dose, ordered     Screening tests:  Colon cancer screening--> Due 2032, 10 year schedule  Breast Cancer screening--> Ordered through GYN  DXA screening--> ordering, due for osteopenia staging     During the course of the visit the patient was educated and counseled about age appropriate screening and preventive services. Completed preventive screenings were documented in the chart and orders were placed for outstanding screenings/procedures as documented in the Assessment and Plan.    Patient Instructions (the written plan) was given to the patient at check out that include any community based lifestyle interventions.    Other risk factors and conditions for which interventions are recommended are addressed as the other tagged diagnoses in this encounter.

## 2024-12-04 NOTE — PROGRESS NOTES
Subjective   Reason for Visit: Brie Neumann is an 69 y.o. female here for a Medicare Wellness visit.     Past Medical, Surgical, and Family History reviewed and updated in chart.    Reviewed all medications by prescribing practitioner or clinical pharmacist (such as prescriptions, OTCs, herbal therapies and supplements) and documented in the medical record.    HPI    70 yo female presents for follow up and medicare wellness     Review of labs from this week:   A1c 6.3 to 6  Large change in cholesterol 198-242 and 95 to 129 for LDL    Since change in glasses has been feeling a little off balance   6-8 months though in duration   Having a lot of sinus pressure and discomfort  Thick drainage and some nose bleeds, isolated     Patient Care Team:  Kandis Lala DO as PCP - General (Family Medicine)  Kandis Lala DO as PCP - [facility] Attributed Provider  Mally French MD as Surgeon (General Surgery)     Past Medical History:   Diagnosis Date    Personal history of other endocrine, nutritional and metabolic disease     History of hypothyroidism     Past Surgical History:   Procedure Laterality Date    INCISIONAL BREAST BIOPSY Bilateral     benign bilat breast bx    OTHER SURGICAL HISTORY  12/13/2019    Breast biopsy     Family History   Problem Relation Name Age of Onset    Bone cancer Mother      Coronary artery disease Father      Breast cancer Sister          passed away    Lung cancer Sister          passed away    Diabetes type II Father's Sister      Diabetes type II Father's Brother      Stroke Paternal Grandmother          passed away     Body mass index is 21.79 kg/m².    Tobacco/Alcohol/Opioid use, as well as Illicit Drug Use was screened for/reviewed and documented in Social History section and medication list as appropriate    Medications and Supplements  prescribed by me and other practitioners or clinical pharmacist (such as prescriptions, OTC's, herbal therapies and supplements) were reviewed  "and documented in the medical record.    Tobacco/Alcohol/Opioid use, as well as Illicit Drug Use was screened for/reviewed and documented in Social History section and medication list as appropriate    Review of Systems   Constitutional:  Negative for activity change, fatigue and fever.   HENT:  Positive for congestion, nosebleeds, postnasal drip, rhinorrhea, sinus pressure and sinus pain. Negative for trouble swallowing.    Respiratory:  Negative for cough, shortness of breath and wheezing.    Neurological:  Positive for light-headedness. Negative for dizziness, tremors, seizures, facial asymmetry, speech difficulty, weakness, numbness and headaches.   Objective   Vitals:  /82 (BP Location: Right arm, Patient Position: Sitting)   Pulse 75   Ht 1.6 m (5' 3\")   Wt 55.8 kg (123 lb)   SpO2 97%   BMI 21.79 kg/m²       Physical Exam  Constitutional:       Appearance: Normal appearance.   HENT:      Head: Normocephalic and atraumatic.      Mouth/Throat:      Comments: Sinus pressure and pain to palpation  Congestion  Some slight bulging of the ears   Cardiovascular:      Rate and Rhythm: Normal rate and regular rhythm.   Pulmonary:      Effort: Pulmonary effort is normal. No respiratory distress.      Breath sounds: No wheezing.   Neurological:      General: No focal deficit present.      Mental Status: She is alert and oriented to person, place, and time.   Psychiatric:         Behavior: Behavior normal.       Assessment & Plan  Medicare annual wellness visit, subsequent  PNEUMONIA vaccine- Ordered  SHINGLES vaccine- Completed  INFLUENZA vaccine- patient requests non high dose, ordered     Screening tests:  Colon cancer screening--> Due 2032, 10 year schedule  Breast Cancer screening--> Ordered through GYN  DXA screening--> ordering, due for osteopenia staging     During the course of the visit the patient was educated and counseled about age appropriate screening and preventive services. Completed preventive " screenings were documented in the chart and orders were placed for outstanding screenings/procedures as documented in the Assessment and Plan.    Patient Instructions (the written plan) was given to the patient at check out that include any community based lifestyle interventions.    Other risk factors and conditions for which interventions are recommended are addressed as the other tagged diagnoses in this encounter.        Osteopenia of multiple sites  Orders:    XR DEXA bone density; Future    Postmenopausal  Orders:    XR DEXA bone density; Future    Mixed hyperlipidemia  Increase dietary fiber   Work on lifestyle modifications, trying to avoid statin therapy        Prediabetes  Improving 6.3 to 6  Continue to work on diet modifications  Orders:    Follow Up In [facility] - PCP - Established    Acquired hypothyroidism  Thyroid labs WNL in 12/24, continue present dosing of levothyroxine 25mcg        Needs flu shot  Orders:    Flu vaccine, trivalent, preservative free, age 6 months and greater (Fluarix/Fluzone/Flulaval)    Sinusitis, unspecified chronicity, unspecified location  Patient has exhibited symptoms for more than 7 days with worsened features and symptoms including one of the clinical practice guidelines to indicate bacterial sinusitis (purulent nasal drainage, unilateral nasal obstruction, facial pain/pressure, and/or anosmia).In patients with rhinosinusitis, antibiotic therapy is recommended if the above criteria are met.   Patient is to be treated with: Augmentin   Patient is advised on recommended duration of treatment, allergies are checked with the patient to assure no hx of reaction, patient advised on possible side effects of this medication.   Patient may also use symptomatic relief for this condition including nasal saline irrigation, intranasal corticosteroids.    Orders:    amoxicillin-pot clavulanate (Augmentin) 875-125 mg tablet; Take 1 tablet (875 mg) by mouth 2 times a day for  10 days.      Slight miscommunication my understand was her and her (in at the same time of her visit) had the prevnar 13, and due for prevnar 20. While that was the case for him, on records review from [facility] it looks like she did not have prevnar 13 but had 20, so this is her 2nd dose.

## 2025-01-16 ENCOUNTER — HOSPITAL ENCOUNTER (OUTPATIENT)
Dept: RADIOLOGY | Facility: CLINIC | Age: 70
Discharge: HOME | End: 2025-01-16
Payer: MEDICARE

## 2025-01-16 ENCOUNTER — TELEPHONE (OUTPATIENT)
Dept: PRIMARY CARE | Facility: CLINIC | Age: 70
End: 2025-01-16
Payer: MEDICARE

## 2025-01-16 ENCOUNTER — PATIENT MESSAGE (OUTPATIENT)
Dept: PRIMARY CARE | Facility: CLINIC | Age: 70
End: 2025-01-16

## 2025-01-16 DIAGNOSIS — M81.0 AGE-RELATED OSTEOPOROSIS WITHOUT CURRENT PATHOLOGICAL FRACTURE: Primary | ICD-10-CM

## 2025-01-16 DIAGNOSIS — M85.89 OSTEOPENIA OF MULTIPLE SITES: ICD-10-CM

## 2025-01-16 DIAGNOSIS — Z78.0 POSTMENOPAUSAL: ICD-10-CM

## 2025-01-16 PROCEDURE — 77080 DXA BONE DENSITY AXIAL: CPT | Performed by: RADIOLOGY

## 2025-01-16 PROCEDURE — 77080 DXA BONE DENSITY AXIAL: CPT

## 2025-01-16 RX ORDER — ALENDRONATE SODIUM 70 MG/1
70 TABLET ORAL
Qty: 4 TABLET | Refills: 11 | Status: SHIPPED | OUTPATIENT
Start: 2025-01-16 | End: 2026-01-16

## 2025-01-16 NOTE — TELEPHONE ENCOUNTER
"----- Message from Kandis Lala sent at 1/16/2025  2:46 PM EST -----  Femur   0--> -0.2 , slight improvement still in normal range   Femoral neck    -1.5-> -1.7 progressed, still osteopenia   Spine   -2.4--> -2.9 progressed from osteopenia to osteoporosis     Given progression of disease into the osteoporotic category I think we should think about therapy with fosamax, provided no hx of issues with swallowing or high calcium.   What this means is the bone is less dense than normal and more likely to sustain injury like fracture.   Our goals are to prevent further deterioration and reduce fall risks.  1.  1200 mg - 1500 mg calcium per day if no history of renal calculi for adults 50 years and over.  2.  800 - 1000 International Units of vitamin D3 per day if no history of renal calculi for adults 50 years and over.  3.  Weight bearing exercise- yoga, walking are great options, or the [facility] has a \"growing stronger\" free handout online with programming.  4.  Advise against smoking.  If currently smoking, recommend cessation.  5.  Avoid excessive use of caffeine, soft drinks, and alcoholic beverages as this works against your bone's health.  6. Make your home safer to reduce falls-remove small throw rugs/secure rugs from slipping, keep items in cabinets you can easily reach without a stool, non slip mats for the bathtub, proper lighting in the home and having eyes regularly checked, handrails where needed, and good supportive shoes inside and outside the home.       "

## 2025-01-17 DIAGNOSIS — E03.9 ACQUIRED HYPOTHYROIDISM: Chronic | ICD-10-CM

## 2025-01-17 RX ORDER — LEVOTHYROXINE SODIUM 25 UG/1
12.5 TABLET ORAL DAILY
Qty: 45 TABLET | Refills: 1 | Status: SHIPPED | OUTPATIENT
Start: 2025-01-17

## 2025-02-03 ENCOUNTER — TELEPHONE (OUTPATIENT)
Dept: PRIMARY CARE | Facility: CLINIC | Age: 70
End: 2025-02-03
Payer: MEDICARE

## 2025-02-03 NOTE — TELEPHONE ENCOUNTER
Patient got a letter stating the insurance says the medication has a recall on it. Should she still take it? Please advise  levothyroxine (Synthroid, Levoxyl) 25 mcg tablet [[id number]]    Order Details  Dose: 12.5 mcg Route: oral Frequency: Daily   Dispense Quantity: 45 tablet Refills: 1          Sig: TAKE 1/2 TABLET BY MOUTH EVERY DAY         Start Date: 01/17/25 End Date: --   Written Date: 01/17/25 Rx Expiration Date: 01/17/26    Pharmacy    [id number] - [place], [place] - [address] [place] [address]

## 2025-02-19 ENCOUNTER — HOSPITAL ENCOUNTER (OUTPATIENT)
Dept: RADIOLOGY | Facility: HOSPITAL | Age: 70
Discharge: HOME | End: 2025-02-19
Payer: MEDICARE

## 2025-02-19 VITALS — WEIGHT: 123 LBS | BODY MASS INDEX: 21.79 KG/M2 | HEIGHT: 63 IN

## 2025-02-19 DIAGNOSIS — Z12.31 ENCOUNTER FOR SCREENING MAMMOGRAM FOR BREAST CANCER: ICD-10-CM

## 2025-02-19 PROCEDURE — 77067 SCR MAMMO BI INCL CAD: CPT | Performed by: RADIOLOGY

## 2025-02-19 PROCEDURE — 77067 SCR MAMMO BI INCL CAD: CPT

## 2025-02-19 PROCEDURE — 77063 BREAST TOMOSYNTHESIS BI: CPT | Performed by: RADIOLOGY

## 2025-02-20 ENCOUNTER — TELEPHONE (OUTPATIENT)
Dept: PRIMARY CARE | Facility: CLINIC | Age: 70
End: 2025-02-20
Payer: MEDICARE

## 2025-02-20 NOTE — TELEPHONE ENCOUNTER
----- Message from Kandis Lala sent at 2/20/2025  8:25 AM EST -----  Review of mammogram, dense tissue but no areas of concern will continue with annual screenings.

## 2025-02-21 ENCOUNTER — APPOINTMENT (OUTPATIENT)
Dept: SURGERY | Facility: CLINIC | Age: 70
End: 2025-02-21
Payer: MEDICARE

## 2025-02-21 VITALS
BODY MASS INDEX: 22.54 KG/M2 | WEIGHT: 127.2 LBS | HEIGHT: 63 IN | DIASTOLIC BLOOD PRESSURE: 69 MMHG | OXYGEN SATURATION: 99 % | HEART RATE: 54 BPM | SYSTOLIC BLOOD PRESSURE: 133 MMHG

## 2025-02-21 DIAGNOSIS — Z12.31 ENCOUNTER FOR SCREENING MAMMOGRAM FOR BREAST CANCER: ICD-10-CM

## 2025-02-21 DIAGNOSIS — R92.1 CALCIFICATION OF RIGHT BREAST: ICD-10-CM

## 2025-02-21 DIAGNOSIS — N60.21 SCLEROSING ADENOSIS OF RIGHT BREAST: ICD-10-CM

## 2025-02-21 DIAGNOSIS — R92.1 CALCIFICATION OF LEFT BREAST: Primary | ICD-10-CM

## 2025-02-21 PROBLEM — R92.8 ABNORMAL MAMMOGRAM: Status: RESOLVED | Noted: 2023-05-16 | Resolved: 2025-02-21

## 2025-02-21 PROCEDURE — 1159F MED LIST DOCD IN RCRD: CPT | Performed by: SURGERY

## 2025-02-21 PROCEDURE — 1157F ADVNC CARE PLAN IN RCRD: CPT | Performed by: SURGERY

## 2025-02-21 PROCEDURE — 1123F ACP DISCUSS/DSCN MKR DOCD: CPT | Performed by: SURGERY

## 2025-02-21 PROCEDURE — 99213 OFFICE O/P EST LOW 20 MIN: CPT | Performed by: SURGERY

## 2025-02-21 PROCEDURE — 1036F TOBACCO NON-USER: CPT | Performed by: SURGERY

## 2025-02-21 PROCEDURE — 1160F RVW MEDS BY RX/DR IN RCRD: CPT | Performed by: SURGERY

## 2025-02-21 PROCEDURE — 3008F BODY MASS INDEX DOCD: CPT | Performed by: SURGERY

## 2025-02-21 PROCEDURE — G2211 COMPLEX E/M VISIT ADD ON: HCPCS | Performed by: SURGERY

## 2025-02-21 NOTE — PROGRESS NOTES
GENERAL SURGERY OFFICE NOTE    Patient: Brie Neumann    Age: 69 y.o.   Gender: female    MRN: [id number]    PCP: Kandis Lala, DO        SUBJECTIVE     Chief Complaint  Follow-up (Patient is here for a 6 month left breast follow up. Patient states that she is not having any problems with either breast.)       HANH Baird returns to the office for a  1.5 0- year follow-up of her left breast asymmetry and 6-month follow-up of left breast calcifications.  Over the last 2+ years, she has had both calcifications and asymmetries which have been monitored.  In the last 6 months, she has not noticed any new breast issues.  No palpable masses, skin changes or nipple discharge.  She recently underwent a bilateral screening mammogram which showed no concerns for malignancy.  She states that she was recently diagnosed with osteoporosis and has been started on Fosamax.  She complains of some occasional lightheadedness when she stands up, but this usually self resolves quickly.  She still tries to remain very active.     Risk factors for breast cancer: 69-year-old white female; menarche at age 12 or 13; first live birth at age 30; 2 previous breast biopsies; she had a sister who  at age 52 of metastatic cancer which may, or may not, have been breast cancer. She does state that her mother  of bone cancer at age 57 but also does not know if this was breast cancer, or not. This gives her a 5-year Carli score of 5% and a lifetime risk of 17.8% assuming one first-degree relative had breast cancer. If no first-degree relative had breast cancer, she still has a higher risk score with a 5-year risk score of 3.4% and a lifetime risk of 12.6%. Overall this puts her in a higher than average risk breast cancer category. Right breast biopsy diagnosis with sclerosing adenosis is also a marker for increased risk of breast cancer.     ROS  Review of Systems   Constitutional: no fever~and~no chills.   Eyes: no loss of vision,~no  discharge from the eyes,~no itching of the eyes~and~no eye pain.   ENT: no hearing loss,~no neck pain~and~no hoarseness.   Cardiovascular: no chest pain,~no palpitations~and~no lower extremity edema.   Respiratory: no dyspnea,~no dyspnea during exertion~and~no cough.   Breast: no nipple discharge,~no breast mass,~no pain in breast,~no erythema,~no change in breast skin~and~no axillary adenopathy.   Gastrointestinal: no abdominal pain,~no vomiting,~no nausea.   Genitourinary: no dysuria~and~no hematuria.   Musculoskeletal: no arthralgias~and~no myalgias.   Integumentary: no rashes~and~no skin lesions.   Neurological: no headache,~no dizziness~and~no numbness.   Psychiatric: no anxiety,~no depression~and~no emotional problems.   Endocrine: no heat or cold intolerance~and~no increased thirst.   Hematologic/Lymphatic: no tendency for easy bleeding~and~no tendency for easy bruising.   All other systems have been reviewed and are negative for complaint.     HISTORY     Past Medical History:   Diagnosis Date    Abnormal mammogram 05/16/2023    Fibrocystic breast 1979    Personal history of other endocrine, nutritional and metabolic disease     History of hypothyroidism        Past Surgical History:   Procedure Laterality Date    BREAST BIOPSY  2021    INCISIONAL BREAST BIOPSY Bilateral     benign bilat breast bx    OTHER SURGICAL HISTORY  12/13/2019    Breast biopsy        No Known Allergies     Social History     Tobacco Use   Smoking Status Never   Smokeless Tobacco Never        Social History     Substance and Sexual Activity   Alcohol Use Never        HOME MEDICATIONS  Current Outpatient Medications   Medication Instructions    alendronate (FOSAMAX) 70 mg, oral, Every 7 days, Take in the morning with a full glass of water, on an empty stomach, and do not take anything else by mouth or lie down for the next 30 min.    Ca carb-D3-argnin-inos-silicon (Bone Density Calcium Plus D) 300-200-37.5 mg-unit-mg tablet 1 tablet,  "Daily    cholecalciferol (VITAMIN D-3) 50 mcg, Daily    coenzyme Q10 400 mg capsule 1 capsule, Daily    fish oil concentrate (Omega-3) 120-180 mg capsule 1 g, Daily    levothyroxine (SYNTHROID, LEVOXYL) 12.5 mcg, oral, Daily    loratadine (CLARITIN) 10 mg, oral, Daily    multivitamin tablet 1 tablet, Daily          OBJECTIVE   Last Recorded Vitals.  Blood pressure 133/69, pulse 54, height 1.6 m (5' 3\"), weight 57.7 kg (127 lb 3.2 oz), SpO2 99%.     PHYSICAL EXAM  Physical Exam   General: Well-developed, well-nourished and in no acute distress.  Head: Normocephalic. Atraumatic.  Neck/thyroid: Neck is supple. Normal thyroid without mass. No jugular venous distention.  Eyes: Pupils equal round and reactive to light. Conjunctiva normal.  ENMT: No masses or deformity of external nose. External ears without masses.  Respiratory/Chest: Normal respiratory effort.  Breast: Symmetrical bilateral small breast. No palpable masses. Right breast has scar from biopsy at the 9 o'clock position. No palpable masses, but some dense breast tissue especially of the upper outer quadrant. Left breast has scar from biopsy at 12 o'clock position. No palpable mass, but there is some dense breast tissue especially of the upper outer quadrant. There is a 2 x 2 centimeter lipoma just outside the inferior lateral aspect of the right breast tissue.  Lymphatics: No palpable lymphadenopathy of the cervical, supraclavicular or axillary regions.  Cardiovascular: Regular rate and rhythm.   Abdomen: Soft, nontender, nondistended. No hernias. No hepatomegaly, splenomegaly or palpable masses.  Rectal: Deferred  : Normal external genitalia  Musculoskeletal: Joints and limbs are grossly normal. Normal gait. Normal range of motion of major joints.  Neuro: Oriented to person, place and time. No obvious neurological deficit. Motor strength grossly normal.  Psych: Normal mood and affect.     RESULTS   Labs  No results found for this or any previous visit " (from the past 24 hours).    Radiology Resutls  mammo bilateral screening tomosynthesis  Status: Final result     PACS Images     Show images for BI mammo bilateral screening tomosynthesis  Signed by    Signed Time Phone Pager   Leo Sultana MD 2/20/2025 08:21 [phone]     Exam Information    Status Exam Begun Exam Ended   Final 2/19/2025 12:36 2/19/2025 13:17     Study Result    Narrative & Impression   Interpreted By:  Leo Sultana,   STUDY:  BI MAMMO BILATERAL SCREENING TOMOSYNTHESIS;  2/19/2025 1:17 pm      ACCESSION NUMBER(S):  [id number]      ORDERING CLINICIAN:  DEEPALI DEL ANGEL      INDICATION:  Signs/Symptoms:Health maintenance.      ,Z12.31 Encounter for screening mammogram for malignant neoplasm of  breast      COMPARISON:  08/13/2024, 02/07/2024 and 02/02/2022 and 07/24/2013      FINDINGS:  2D and tomosynthesis images were reviewed at 1 mm slice thickness.      Density:  The breasts are heterogeneously dense, which may obscure  small masses.      No suspicious masses or calcifications are identified.      This study was interpreted with CAD. Markers: [place]- skin lesion;  triangle- palpable abnormality      IMPRESSION:  No mammographic evidence of malignancy.      BI-RADS CATEGORY:      BI-RADS Category:  1 Negative.  Recommendation:  Routine Screening Mammogram in 1 Year.  Recommended Date:  1 Year.  Laterality:  Bilateral.      For any future breast imaging appointments, please call [phone]).          MACRO:  None          Signed by: Leo Sultana 2/20/2025 8:21 AM  Dictation workstation:   [id number]         ASSESSMENT / PLAN   Diagnoses and all orders for this visit:  Calcification of left breast  Sclerosing adenosis of right breast  Calcification of right breast  Encounter for screening mammogram for breast cancer  -     BI mammo bilateral screening tomosynthesis; Future          Plan  Jan 2021: RIGHT; CNB with sclerosing adenosis with microcalcs  Feb 2023: LEFT;  asymmetry  Feb 2024: LEFT; new calcs without asymmetry seen     1. RIGHT breast sclerosis adenosis diagnosed Jan 2021.  This area has been monitored for 2 years and is unchanged.  Therefore, her right breast should continue just with her yearly screening mammograms.  Most recent screening mammogram of the right breast was benign.    2. With her family history of a first-degree relative with breast cancer and the diagnosis of sclerosing adenosis, this does put her at a slightly increased risk of breast cancer. Since her lifetime risk Carli score is less than 20%, she would not qualify for breast MRI.   3.  The asymmetry previously seen on her left breast is no longer visible by mammogram.  4.  Left breast calcifications appear stable.    5.  She is encouraged to do her self breast exams, and contact the office if she identifies any abnormalities.  6.  All of the above abnormalities remained stable.  Therefore, she will continue with just her yearly screening mammograms.  She has elected to stay with our office for her ongoing breast health.  She will return to the office after her next screening mammogram.  7.  With her osteoporosis, have encouraged her to take a twice daily dosing of calcium/vitamin D in addition to her Fosamax.      Mally French MD, FACS  [facility] General Surgery  [facility]. [facility];   [address]  [place]  [address] [phone]

## 2025-02-21 NOTE — PATIENT INSTRUCTIONS
"1. Continue with your monthly self breast exams. If you identify any new masses, please call Dr. French's office for evaluation at [phone].  2.  The calcifications and \"abnormalities\" of your left breast appear stable by x-ray and physical exam.  You will be due for a yearly screening mammogram of both breast in 1 year.  Follow-up in Dr. French's office after this mammogram.  3.  You should take a calcium/vitamin D supplement twice a day to help with your osteoporosis.  "

## 2025-03-31 DIAGNOSIS — M81.0 AGE-RELATED OSTEOPOROSIS WITHOUT CURRENT PATHOLOGICAL FRACTURE: ICD-10-CM

## 2025-03-31 RX ORDER — ALENDRONATE SODIUM 70 MG/1
70 TABLET ORAL
Qty: 4 TABLET | Refills: 11 | Status: SHIPPED | OUTPATIENT
Start: 2025-03-31 | End: 2026-03-31

## 2025-07-22 DIAGNOSIS — E03.9 ACQUIRED HYPOTHYROIDISM: Chronic | ICD-10-CM

## 2025-07-22 RX ORDER — LEVOTHYROXINE SODIUM 25 UG/1
12.5 TABLET ORAL DAILY
Qty: 45 TABLET | Refills: 1 | Status: SHIPPED | OUTPATIENT
Start: 2025-07-22

## 2025-12-04 ENCOUNTER — APPOINTMENT (OUTPATIENT)
Dept: PRIMARY CARE | Facility: CLINIC | Age: 70
End: 2025-12-04
Payer: MEDICARE

## 2026-02-27 ENCOUNTER — APPOINTMENT (OUTPATIENT)
Dept: SURGERY | Facility: CLINIC | Age: 71
End: 2026-02-27
Payer: MEDICARE